# Patient Record
Sex: FEMALE | Race: WHITE | Employment: STUDENT | URBAN - METROPOLITAN AREA
[De-identification: names, ages, dates, MRNs, and addresses within clinical notes are randomized per-mention and may not be internally consistent; named-entity substitution may affect disease eponyms.]

---
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
## 2025-06-09 ENCOUNTER — HOSPITAL ENCOUNTER (EMERGENCY)
Facility: CLINIC | Age: 20
Discharge: LEFT BEFORE DISCHARGE | End: 2025-06-10
Admitting: EMERGENCY MEDICINE
Payer: COMMERCIAL

## 2025-06-09 VITALS
SYSTOLIC BLOOD PRESSURE: 118 MMHG | TEMPERATURE: 97.7 F | OXYGEN SATURATION: 99 % | HEART RATE: 67 BPM | RESPIRATION RATE: 17 BRPM | DIASTOLIC BLOOD PRESSURE: 70 MMHG

## 2025-06-09 PROCEDURE — 99281 EMR DPT VST MAYX REQ PHY/QHP: CPT

## 2025-06-09 ASSESSMENT — COLUMBIA-SUICIDE SEVERITY RATING SCALE - C-SSRS
1. IN THE PAST MONTH, HAVE YOU WISHED YOU WERE DEAD OR WISHED YOU COULD GO TO SLEEP AND NOT WAKE UP?: NO
2. HAVE YOU ACTUALLY HAD ANY THOUGHTS OF KILLING YOURSELF IN THE PAST MONTH?: NO
6. HAVE YOU EVER DONE ANYTHING, STARTED TO DO ANYTHING, OR PREPARED TO DO ANYTHING TO END YOUR LIFE?: NO

## 2025-06-09 ASSESSMENT — ACTIVITIES OF DAILY LIVING (ADL): ADLS_ACUITY_SCORE: 41

## 2025-06-10 ASSESSMENT — ACTIVITIES OF DAILY LIVING (ADL): ADLS_ACUITY_SCORE: 41

## 2025-06-10 NOTE — ED TRIAGE NOTES
Pt reports lower back pain that wraps around to abd for past 4 days, worse tonight. Pt reports no BM for 5 days and took miralax without relief.      Triage Assessment (Adult)       Row Name 06/09/25 2526          Triage Assessment    Airway WDL WDL        Respiratory WDL    Respiratory WDL WDL        Cardiac WDL    Cardiac WDL WDL        Peripheral/Neurovascular WDL    Peripheral Neurovascular WDL WDL        Cognitive/Neuro/Behavioral WDL    Cognitive/Neuro/Behavioral WDL WDL

## 2025-06-11 ENCOUNTER — APPOINTMENT (OUTPATIENT)
Dept: CT IMAGING | Facility: CLINIC | Age: 20
End: 2025-06-11
Attending: PHYSICIAN ASSISTANT
Payer: COMMERCIAL

## 2025-06-11 ENCOUNTER — OFFICE VISIT (OUTPATIENT)
Dept: URGENT CARE | Facility: URGENT CARE | Age: 20
End: 2025-06-11
Payer: COMMERCIAL

## 2025-06-11 ENCOUNTER — HOSPITAL ENCOUNTER (EMERGENCY)
Facility: CLINIC | Age: 20
Discharge: HOME | End: 2025-06-11
Attending: EMERGENCY MEDICINE
Payer: COMMERCIAL

## 2025-06-11 VITALS
RESPIRATION RATE: 17 BRPM | OXYGEN SATURATION: 96 % | BODY MASS INDEX: 25.07 KG/M2 | SYSTOLIC BLOOD PRESSURE: 127 MMHG | TEMPERATURE: 100.8 F | HEIGHT: 66 IN | WEIGHT: 156 LBS | HEART RATE: 102 BPM | DIASTOLIC BLOOD PRESSURE: 77 MMHG

## 2025-06-11 VITALS
HEIGHT: 66 IN | DIASTOLIC BLOOD PRESSURE: 63 MMHG | BODY MASS INDEX: 24.11 KG/M2 | WEIGHT: 150 LBS | RESPIRATION RATE: 19 BRPM | HEART RATE: 115 BPM | OXYGEN SATURATION: 97 % | SYSTOLIC BLOOD PRESSURE: 124 MMHG | TEMPERATURE: 98.9 F

## 2025-06-11 DIAGNOSIS — K59.00 CONSTIPATION, UNSPECIFIED CONSTIPATION TYPE: ICD-10-CM

## 2025-06-11 DIAGNOSIS — M54.50 ACUTE LEFT-SIDED LOW BACK PAIN WITHOUT SCIATICA: ICD-10-CM

## 2025-06-11 DIAGNOSIS — M54.9 BACK PAIN: ICD-10-CM

## 2025-06-11 DIAGNOSIS — R50.9 FEVER IN ADULT: Primary | ICD-10-CM

## 2025-06-11 DIAGNOSIS — R50.9 FEVER: ICD-10-CM

## 2025-06-11 DIAGNOSIS — R10.814 ABDOMINAL TENDERNESS OF LEFT LOWER QUADRANT, REBOUND TENDERNESS PRESENCE NOT SPECIFIED: ICD-10-CM

## 2025-06-11 LAB
ALBUMIN SERPL BCG-MCNC: 4.9 G/DL (ref 3.5–5.2)
ALBUMIN UR-MCNC: NEGATIVE MG/DL
ALP SERPL-CCNC: 62 U/L (ref 40–150)
ALT SERPL W P-5'-P-CCNC: 13 U/L (ref 0–50)
ANION GAP SERPL CALCULATED.3IONS-SCNC: 16 MMOL/L (ref 7–15)
APPEARANCE UR: CLEAR
AST SERPL W P-5'-P-CCNC: 19 U/L (ref 0–45)
BASOPHILS # BLD AUTO: 0.1 10E3/UL (ref 0–0.2)
BASOPHILS NFR BLD AUTO: 1 %
BILIRUB DIRECT SERPL-MCNC: 0.21 MG/DL (ref 0–0.3)
BILIRUB SERPL-MCNC: 0.7 MG/DL
BILIRUB UR QL STRIP: NEGATIVE
BUN SERPL-MCNC: 10.7 MG/DL (ref 6–20)
CALCIUM SERPL-MCNC: 9.7 MG/DL (ref 8.8–10.4)
CHLORIDE SERPL-SCNC: 97 MMOL/L (ref 98–107)
COLOR UR AUTO: ABNORMAL
CREAT SERPL-MCNC: 0.89 MG/DL (ref 0.51–0.95)
EGFRCR SERPLBLD CKD-EPI 2021: >90 ML/MIN/1.73M2
EOSINOPHIL # BLD AUTO: 0 10E3/UL (ref 0–0.7)
EOSINOPHIL NFR BLD AUTO: 0 %
ERYTHROCYTE [DISTWIDTH] IN BLOOD BY AUTOMATED COUNT: 11.6 % (ref 10–15)
FLUAV RNA SPEC QL NAA+PROBE: NEGATIVE
FLUBV RNA RESP QL NAA+PROBE: NEGATIVE
GLUCOSE SERPL-MCNC: 115 MG/DL (ref 70–99)
GLUCOSE UR STRIP-MCNC: NEGATIVE MG/DL
HCG UR QL: NEGATIVE
HCO3 SERPL-SCNC: 23 MMOL/L (ref 22–29)
HCT VFR BLD AUTO: 39.6 % (ref 35–47)
HGB BLD-MCNC: 13.5 G/DL (ref 11.7–15.7)
HGB UR QL STRIP: NEGATIVE
IMM GRANULOCYTES # BLD: 0 10E3/UL
IMM GRANULOCYTES NFR BLD: 0 %
KETONES UR STRIP-MCNC: 10 MG/DL
LEUKOCYTE ESTERASE UR QL STRIP: NEGATIVE
LYMPHOCYTES # BLD AUTO: 1.1 10E3/UL (ref 0.8–5.3)
LYMPHOCYTES NFR BLD AUTO: 11 %
MCH RBC QN AUTO: 29.4 PG (ref 26.5–33)
MCHC RBC AUTO-ENTMCNC: 34.1 G/DL (ref 31.5–36.5)
MCV RBC AUTO: 86 FL (ref 78–100)
MONOCYTES # BLD AUTO: 0.8 10E3/UL (ref 0–1.3)
MONOCYTES NFR BLD AUTO: 8 %
NEUTROPHILS # BLD AUTO: 7.7 10E3/UL (ref 1.6–8.3)
NEUTROPHILS NFR BLD AUTO: 80 %
NITRATE UR QL: NEGATIVE
NRBC # BLD AUTO: 0 10E3/UL
NRBC BLD AUTO-RTO: 0 /100
PH UR STRIP: 6.5 [PH] (ref 5–7)
PLATELET # BLD AUTO: 226 10E3/UL (ref 150–450)
POTASSIUM SERPL-SCNC: 3.7 MMOL/L (ref 3.4–5.3)
PROT SERPL-MCNC: 8.3 G/DL (ref 6.4–8.3)
RBC # BLD AUTO: 4.59 10E6/UL (ref 3.8–5.2)
RSV RNA SPEC NAA+PROBE: NEGATIVE
SARS-COV-2 RNA RESP QL NAA+PROBE: NEGATIVE
SODIUM SERPL-SCNC: 136 MMOL/L (ref 135–145)
SP GR UR STRIP: 1 (ref 1–1.03)
TSH SERPL DL<=0.005 MIU/L-ACNC: 1.11 UIU/ML (ref 0.3–4.2)
UROBILINOGEN UR STRIP-MCNC: NORMAL MG/DL
WBC # BLD AUTO: 9.6 10E3/UL (ref 4–11)

## 2025-06-11 PROCEDURE — 258N000003 HC RX IP 258 OP 636: Performed by: EMERGENCY MEDICINE

## 2025-06-11 PROCEDURE — 85025 COMPLETE CBC W/AUTO DIFF WBC: CPT | Performed by: EMERGENCY MEDICINE

## 2025-06-11 PROCEDURE — 74177 CT ABD & PELVIS W/CONTRAST: CPT

## 2025-06-11 PROCEDURE — 81003 URINALYSIS AUTO W/O SCOPE: CPT | Performed by: EMERGENCY MEDICINE

## 2025-06-11 PROCEDURE — 3078F DIAST BP <80 MM HG: CPT | Performed by: NURSE PRACTITIONER

## 2025-06-11 PROCEDURE — 84443 ASSAY THYROID STIM HORMONE: CPT | Performed by: EMERGENCY MEDICINE

## 2025-06-11 PROCEDURE — 84155 ASSAY OF PROTEIN SERUM: CPT | Performed by: EMERGENCY MEDICINE

## 2025-06-11 PROCEDURE — 85004 AUTOMATED DIFF WBC COUNT: CPT | Performed by: EMERGENCY MEDICINE

## 2025-06-11 PROCEDURE — 87637 SARSCOV2&INF A&B&RSV AMP PRB: CPT | Performed by: PHYSICIAN ASSISTANT

## 2025-06-11 PROCEDURE — 250N000009 HC RX 250: Performed by: PHYSICIAN ASSISTANT

## 2025-06-11 PROCEDURE — 250N000011 HC RX IP 250 OP 636: Performed by: PHYSICIAN ASSISTANT

## 2025-06-11 PROCEDURE — 99205 OFFICE O/P NEW HI 60 MIN: CPT | Performed by: NURSE PRACTITIONER

## 2025-06-11 PROCEDURE — 81025 URINE PREGNANCY TEST: CPT | Performed by: EMERGENCY MEDICINE

## 2025-06-11 PROCEDURE — 80048 BASIC METABOLIC PNL TOTAL CA: CPT | Performed by: EMERGENCY MEDICINE

## 2025-06-11 PROCEDURE — 3074F SYST BP LT 130 MM HG: CPT | Performed by: NURSE PRACTITIONER

## 2025-06-11 PROCEDURE — 96361 HYDRATE IV INFUSION ADD-ON: CPT

## 2025-06-11 PROCEDURE — 36415 COLL VENOUS BLD VENIPUNCTURE: CPT | Performed by: EMERGENCY MEDICINE

## 2025-06-11 PROCEDURE — 99285 EMERGENCY DEPT VISIT HI MDM: CPT | Mod: 25

## 2025-06-11 PROCEDURE — 96360 HYDRATION IV INFUSION INIT: CPT | Mod: 59

## 2025-06-11 RX ORDER — IOPAMIDOL 755 MG/ML
75 INJECTION, SOLUTION INTRAVASCULAR ONCE
Status: COMPLETED | OUTPATIENT
Start: 2025-06-11 | End: 2025-06-11

## 2025-06-11 RX ADMIN — IOPAMIDOL 75 ML: 755 INJECTION, SOLUTION INTRAVENOUS at 19:40

## 2025-06-11 RX ADMIN — SODIUM CHLORIDE 62 ML: 9 INJECTION, SOLUTION INTRAVENOUS at 19:40

## 2025-06-11 RX ADMIN — SODIUM CHLORIDE 1000 ML: 0.9 INJECTION, SOLUTION INTRAVENOUS at 18:05

## 2025-06-11 ASSESSMENT — ACTIVITIES OF DAILY LIVING (ADL)
ADLS_ACUITY_SCORE: 41

## 2025-06-11 NOTE — PROGRESS NOTES
Urgent Care Clinic Visit    Chief Complaint   Patient presents with    Fever     Last night started to have a fever     Back Pain     X5 days of back pain   Started as lower back pain now moved up     Urgent Care     Last weekend was in [facility] [place]               6/11/2025     4:32 PM   Additional Questions   Roomed by trudy coleman

## 2025-06-11 NOTE — PATIENT INSTRUCTIONS
Triaged to ER for higher level of care given 7/10 left-sided low back pain with fever constipation left lower quadrant abdominal tenderness weakness malaise dizziness worsening over the past 5 days  Last bowel movement was yesterday, small  Urgent care is limited without stat labs advanced imaging IV access  Relevant PMH of appendectomy  No obvious UTI symptoms, no concern for PID, normal menstrual cycle  Differentials include colitis, pyelonephritis, infected kidney stone, intra-abdominal infection abscess, gastroenteritis, ileus obstruction perforation, pelvic infection, less likely ectopic  Patient will go to local ER now by car with mom, call 911 if worsening in route

## 2025-06-11 NOTE — PROGRESS NOTES
"Chief Complaint   Patient presents with    Fever     Last night started to have a fever     Back Pain     X5 days of back pain   Started as lower back pain now moved up     Urgent Care     Last weekend was in [facility]     SUBJECTIVE:  Kisha Brunner is a 20 year old female relevant PMH of appendectomy paraovarian cyst who presents with 7/10 left-sided low back pain constipation left lower quadrant abdominal tenderness weakness malaise dizziness worsening over the past 5 days, fever started yesterday. Last bowel movement was yesterday, small. Took miralax and enemas.  No dysuria hematuria nausea vomiting vaginal discharge pelvic pain.  Endorses normal menstrual cycle. She does have a history of UTIs and this does not feel like one.    No past medical history on file.  Current Outpatient Medications   Medication Sig Dispense Refill    ISOtretinoin (ACCUTANE PO)  (Patient not taking: Reported on 6/11/2025)      Pediatric Multiple Vit-C-FA (FRUITY CHEWABLES MULTIVITAMIN PO)  (Patient not taking: Reported on 6/11/2025)       Social History     Tobacco Use    Smoking status: Never    Smokeless tobacco: Never   Substance Use Topics    Alcohol use: Not on file     No Known Allergies    Review of Systems  ROS: 10 point ROS neg other than the symptoms noted above in the HPI.    OBJECTIVE:  /77   Pulse 102   Temp 100.8  F (38.2  C) (Oral)   Resp 17   Ht 1.676 m (5' 6\")   Wt 70.8 kg (156 lb)   SpO2 96%   BMI 25.18 kg/m      Physical Exam  Vitals reviewed.   Constitutional:       General: She is not in acute distress.     Appearance: Normal appearance. She is well-developed. She is ill-appearing. She is not toxic-appearing or diaphoretic.   Cardiovascular:      Pulses: Normal pulses.   Pulmonary:      Effort: Pulmonary effort is normal.   Abdominal:      General: Bowel sounds are normal. There is no distension.      Palpations: Abdomen is soft. There is no mass.      Tenderness: There is abdominal tenderness " (LLQ abdomen). There is no right CVA tenderness, left CVA tenderness (nontender to percussion), guarding or rebound.      Hernia: No hernia is present.   Musculoskeletal:         General: Normal range of motion.   Skin:     General: Skin is warm and dry.      Capillary Refill: Capillary refill takes less than 2 seconds.      Coloration: Skin is not jaundiced or pale.   Neurological:      General: No focal deficit present.      Mental Status: She is alert and oriented to person, place, and time.      Motor: No weakness.      Gait: Gait normal.   Psychiatric:         Behavior: Behavior normal.       ASSESSMENT:    ICD-10-CM    1. Fever in adult  R50.9 CANCELED: UA Macroscopic with reflex to Microscopic and Culture - Clinic Collect      2. Abdominal tenderness of left lower quadrant, rebound tenderness presence not specified  R10.814       3. Acute left-sided low back pain without sciatica  M54.50       4. Constipation, unspecified constipation type  K59.00         PLAN:   Triaged to ER for higher level of care given 7/10 left-sided low back pain with fever constipation left lower quadrant abdominal tenderness weakness malaise dizziness worsening over the past 5 days  Last bowel movement was yesterday, small  Relevant PMH of appendectomy  No obvious UTI or vaginal symptoms  Differentials include colitis, ileus obstruction perforation, IBD, pyelonephritis, infected kidney stone, intra-abdominal infection abscess, gastroenteritis, pelvic infection, less likely ectopic, PID  Urgent care is limited without stat labs advanced imaging IV access  Patient will go to local ER now by car with mom, call 911 if worsening in route    Follow up with primary care provider with any problems, questions or concerns or if symptoms worsen or fail to improve. Patient agreed to plan and verbalized understanding.    Emilie Huizar, [facility]

## 2025-06-11 NOTE — ED TRIAGE NOTES
5 days ago started getting constipated with LBP.  Reports last night woke up with a temp of 101 with severe back pain on L side.  Sent from clinic for fever/tachycardia.   Took ibuprofen a couple hours ago.  Denies trauma.  Denies incontinence.  Ambulatory.      Triage Assessment (Adult)       Row Name 06/11/25 1800          Triage Assessment    Airway WDL WDL        Respiratory WDL    Respiratory WDL WDL        Skin Circulation/Temperature WDL    Skin Circulation/Temperature WDL WDL        Cardiac WDL    Cardiac WDL WDL        Peripheral/Neurovascular WDL    Peripheral Neurovascular WDL WDL        Cognitive/Neuro/Behavioral WDL    Cognitive/Neuro/Behavioral WDL WDL

## 2025-06-12 ENCOUNTER — HOSPITAL ENCOUNTER (EMERGENCY)
Facility: CLINIC | Age: 20
Discharge: HOME | End: 2025-06-12
Admitting: STUDENT IN AN ORGANIZED HEALTH CARE EDUCATION/TRAINING PROGRAM
Payer: COMMERCIAL

## 2025-06-12 VITALS
OXYGEN SATURATION: 99 % | TEMPERATURE: 98.7 F | DIASTOLIC BLOOD PRESSURE: 78 MMHG | HEIGHT: 66 IN | HEART RATE: 98 BPM | SYSTOLIC BLOOD PRESSURE: 135 MMHG | BODY MASS INDEX: 24.11 KG/M2 | RESPIRATION RATE: 18 BRPM | WEIGHT: 150 LBS

## 2025-06-12 DIAGNOSIS — R10.32 ABDOMINAL PAIN, LEFT LOWER QUADRANT: ICD-10-CM

## 2025-06-12 DIAGNOSIS — K59.00 CONSTIPATION, UNSPECIFIED CONSTIPATION TYPE: ICD-10-CM

## 2025-06-12 PROCEDURE — 99282 EMERGENCY DEPT VISIT SF MDM: CPT

## 2025-06-12 RX ORDER — MAGNESIUM CARB/ALUMINUM HYDROX 105-160MG
296 TABLET,CHEWABLE ORAL ONCE
Qty: 296 ML | Refills: 0 | Status: SHIPPED | OUTPATIENT
Start: 2025-06-12 | End: 2025-06-12

## 2025-06-12 ASSESSMENT — COLUMBIA-SUICIDE SEVERITY RATING SCALE - C-SSRS
2. HAVE YOU ACTUALLY HAD ANY THOUGHTS OF KILLING YOURSELF IN THE PAST MONTH?: NO
1. IN THE PAST MONTH, HAVE YOU WISHED YOU WERE DEAD OR WISHED YOU COULD GO TO SLEEP AND NOT WAKE UP?: NO
6. HAVE YOU EVER DONE ANYTHING, STARTED TO DO ANYTHING, OR PREPARED TO DO ANYTHING TO END YOUR LIFE?: NO

## 2025-06-12 NOTE — DISCHARGE INSTRUCTIONS
You were evaluated in the emergency department today for concerns of continued abdominal and back pain  Your belly is soft and exam is reassuring. Your CT scan yesterday did not show any signs of significant stool burden indicating severe constipation, bowel obstruction, kidney stone, ovarian cyst, or any other acute findings.    Ultimately, if you have not had a bowel movement in a week I recommend continuing daily miralax and increasing your food intake. I have given you a prescription for a medication called magnesium citrate that you can take to help try to produce a bowel movement.     You have full range of motion of your neck in all directions and your physical exam tests for meningitis are negative. You did not show signs of meningitis. I recommend a heating pad on your neck and upper back for 10 to 15 minutes a few times per day to help relax musculature.  You may take Ibuprofen up to 400 mg by mouth every 4-6 hours as needed for pain. Do not exceed 2400 mg/day.  You may take Tylenol 325-1000 mg by mouth every 4-6 hours as needed for pain. Do not exceed 1000mg (1g) in 4 hours or 4 g/day from all sources.  You may combine Tylenol and Ibuprofen- up to 400 mg of ibuprofen and 1000 mg of Tylenol at the same time, up to 3 times a day for the pain    Follow-up with your primary care provider tomorrow as scheduled.    Return to the ER if you develop any severe worsened abdominal pain with bloating and distention, intractable nausea or vomiting, or fevers that are not coming down with Tylenol and ibuprofen

## 2025-06-12 NOTE — ED PROVIDER NOTES
"  Emergency Department Note      History of Present Illness     Chief Complaint   Back Pain      HPI   Kisha Brunner is a 20 year old female with no past medical history presents today with left low back pain, constipation and fever.  Patient reports that for the past 5 to 6 days she has not had a significant bowel movement.  She is normally very regular, but had this change.  This was initially after a trip she felt that initially it may have been due to change in her foods she normally eats.  She does state that she tried MiraLAX and an at-home enema, which neither allowed her to have a significant bowel movement.  She has noted some small, hard bowel movements since this date.  At that time she also developed some mild achy pain in her left lower back, feels like it is not muscular but more internal.  She then states that for the past couple of days this pain has been worsening.  She also developed a fever for the last 2 days, recorded at home with a thermometer up to 101.  She has had previous abdominal surgery of her appendix.  She denies any urinary headaches, URI symptoms, chest pain, shortness of breath.  She denies any dysuria or hematuria.  She denies any melena or hematochezia.  Patient denies any chance of pregnancy or STIs.  Her last menstrual period was 2 weeks ago.    Independent Historian   None    Review of External Notes   None      Past Medical History     Medical History and Problem List   No past medical history on file.    Medications   ISOtretinoin (ACCUTANE PO)  Pediatric Multiple Vit-C-FA (FRUITY CHEWABLES MULTIVITAMIN PO)        Surgical History   No past surgical history on file.    Physical Exam     Patient Vitals for the past 24 hrs:   BP Temp Temp src Pulse Resp SpO2 Height Weight   06/11/25 2217 -- 98.9  F (37.2  C) Oral -- -- -- -- --   06/11/25 1801 124/63 -- -- -- -- -- -- --   06/11/25 1800 -- 100.2  F (37.9  C) Oral 115 19 97 % 1.676 m (5' 6\") 68 kg (150 lb)     Physical " Exam  General: No acute distress  Head: normocephalic, atraumatic  Eyes: Conjunctiva normal  Throat: moist mucus membranes  Neck: ROM normal  Cardiovascular: regular rate, regular rhythm, no murmurs  Pulmonology: Regular rate, normal effort, no wheezing, crackles, or rales  Abdomen: Soft, no masses or organomegaly, Bowel sounds present.  Mild tenderness to left lower quadrant.  No CVA tenderness.  Musculoskeletal: moving upper and lower extremities symmetrically.  No pain to palpation of the lumbar spinous processes.  No pain to palpation of spinal muscles.  Skin: Warm, dry.  No rashes present  Neuro: Alert and oriented  Psych: normal mood and affect        Diagnostics     Lab Results   Labs Ordered and Resulted from Time of ED Arrival to Time of ED Departure   UA MACROSCOPIC WITH REFLEX TO MICRO AND CULTURE - Abnormal       Result Value    Color Urine Straw      Appearance Urine Clear      Glucose Urine Negative      Bilirubin Urine Negative      Ketones Urine 10 (*)     Specific Gravity Urine 1.004      Blood Urine Negative      pH Urine 6.5      Protein Albumin Urine Negative      Urobilinogen Urine Normal      Nitrite Urine Negative      Leukocyte Esterase Urine Negative     BASIC METABOLIC PANEL - Abnormal    Sodium 136      Potassium 3.7      Chloride 97 (*)     Carbon Dioxide (CO2) 23      Anion Gap 16 (*)     Urea Nitrogen 10.7      Creatinine 0.89      GFR Estimate >90      Calcium 9.7      Glucose 115 (*)    HCG QUALITATIVE URINE - Normal    hCG Urine Qualitative Negative     HEPATIC FUNCTION PANEL - Normal    Protein Total 8.3      Albumin 4.9      Bilirubin Total 0.7      Alkaline Phosphatase 62      AST 19      ALT 13      Bilirubin Direct 0.21     TSH WITH FREE T4 REFLEX - Normal    TSH 1.11     INFLUENZA A/B, RSV AND SARS-COV2 PCR - Normal    Influenza A PCR Negative      Influenza B PCR Negative      RSV PCR Negative      SARS CoV2 PCR Negative     CBC WITH PLATELETS AND DIFFERENTIAL    WBC Count 9.6       RBC Count 4.59      Hemoglobin 13.5      Hematocrit 39.6      MCV 86      MCH 29.4      MCHC 34.1      RDW 11.6      Platelet Count 226      % Neutrophils 80      % Lymphocytes 11      % Monocytes 8      % Eosinophils 0      % Basophils 1      % Immature Granulocytes 0      NRBCs per 100 WBC 0      Absolute Neutrophils 7.7      Absolute Lymphocytes 1.1      Absolute Monocytes 0.8      Absolute Eosinophils 0.0      Absolute Basophils 0.1      Absolute Immature Granulocytes 0.0      Absolute NRBCs 0.0         Imaging   CT Abdomen Pelvis w Contrast   Final Result   IMPRESSION:    Nonspecific changes in the upper pole of the left kidney and left ureter. This appearance typically indicates urinary tract infection, but in the absence of supportive findings on urinalysis, renal infarction or sequelae of a recently passed left    ureteral stone could be considered.               Independent Interpretation   None    ED Course      Medications Administered   Medications   sodium chloride 0.9% BOLUS 1,000 mL (0 mLs Intravenous Stopped 6/11/25 2057)   iopamidol (ISOVUE-370) solution 75 mL (75 mLs Intravenous $Given 6/11/25 1940)   sodium chloride 0.9 % bag for CT scan flush (62 mLs Intravenous $Given 6/11/25 1940)       Procedures   Procedures     Discussion of Management   None    ED Course        Additional Documentation  None    Medical Decision Making / Diagnosis     CMS Diagnoses: None    MIPS   None               MDM   Kisha Brunner is a 20 year old female with history of appendectomy presents today with left low back pain and fever.  Patient does report constipation for the past 5 days and development of a fever within the last 2 days.  She has also had this left low back pain that she states is a dull ache.  Possible differentials include colitis, ileus, IBD, diverticulitis, pyelonephritis, kidney stone, gastroenteritis, pelvic infection.  Upon arrival to the emergency department the patient was febrile and  tachycardic.  Proceeded with fluids and basic labs.  CBC with no signs of any white count.  Her BMP did come back with a mild anion gap, consistent with possible dehydration from acute infection.  Given possibility for pyelonephritis or kidney stone as a differential, proceeded with a UA.  This came back negative for any RBC or WBC.  I did proceed with a CT abdomen pelvis given fever and history of constipation.  This came back with a nonspecific change to the left upper pole of the kidney, most consistent with pyelonephritis.  Given completely negative urinalysis, I do not think that this is the most likely cause of this irregularity on imaging.  Other possible causes of this is recent kidney stone or renal infarct.  Patient's history inconsistent with kidney stone, also negative RBC on urinalysis.  Patient with no risk factors for renal infarct, does not seem consistent with her story for this as well.  Will have her follow-up with her primary care doctor for repeat evaluation of this, likely with renal ultrasound.  Patient with no other acute abnormalities on the CT scan.  No significant stool burden, or signs of colitis.  Ovaries were normal in appearance, no signs of any significant abscess or cyst.  Given ovaries were of normal size on CT scan, did not feel like it was likely pathology for fever and pain.  We did proceed with a COVID and flu swab which was negative.  Discussed with patient that she is well-appearing, her fever did improve significantly with the Tylenol.  Discussed that her fever and pain may be from a viral illness that is undetectable on labs today.  She should continue to take Tylenol and ibuprofen for the fever.  She is pleasant rest and drink fluids.  She should follow-up with her primary care later on this week and next week for further evaluation and treatment.  She should return to the emergency department for any worsening pain or significant vomiting.  The patient was in agreement  with the plan all questions were answered    Disposition   The patient was discharged.     Diagnosis     ICD-10-CM    1. Fever  R50.9       2. Back pain  M54.9            Discharge Medications   Discharge Medication List as of 6/11/2025 10:57 PM            Mikayla Cope PA-C    This patient was staffed with Dr. Linwood Augustin, who saw the patient and agreed with the plan.       Mikayla Cope PA-C  06/12/25 0049

## 2025-06-12 NOTE — ED TRIAGE NOTES
PT reports back pain, a headache, and neck stiffness. PT was seen at the ED yesterday got labs and a CT for same sx and told it is a viral infection. PT returning today concerned it is viral meningitis.      Triage Assessment (Adult)       Row Name 06/12/25 2136          Triage Assessment    Airway WDL WDL        Respiratory WDL    Respiratory WDL WDL        Skin Circulation/Temperature WDL    Skin Circulation/Temperature WDL WDL        Peripheral/Neurovascular WDL    Peripheral Neurovascular WDL WDL        Cognitive/Neuro/Behavioral WDL    Cognitive/Neuro/Behavioral WDL WDL

## 2025-06-12 NOTE — DISCHARGE INSTRUCTIONS
We think that your fever and back pain may be from a viral illness today.  Please continue to take Tylenol and ibuprofen for the fever.  Continue to hydrate with lots of fluids.  Please follow-up with your primary care doctor in the next couple of days for further evaluation and treatment.  Your primary care doctor should also follow-up on the abnormality seen on imaging in the kidney, this would likely be with a kidney ultrasound in a few months.

## 2025-06-12 NOTE — ED NOTES
ED APC SUPERVISION NOTE:   I evaluated this patient in conjunction with Mikayla Cope PA-C  I have participated in the care of the patient and personally performed key elements of the history, exam, and medical decision making.      HPI:   Kisha Brunner is a 20 year old female presenting to the ED for evaluation of constipation. The patient reports that she has been experiencing constipation with progressively worsening low back pain for about 5 days. She has had no significant bowel movements with Miralax and at home enemas. The patient has measured a fever of 101 F at home today as well. No black/bloody stools, urinary symptoms, recent trauma to the back. No concern for STD or chance of pregnancy.  He notes mild left lower abdominal pain.  No pelvic pain, vaginal bleeding or vaginal discharge.  She denies any urinary symptoms.  No hematuria.  No URI symptoms, chest pain, cough or shortness of breath.    Independent Historian:   None    Review of External Notes: None      EXAM:   General: Well appearing, nontoxic.  Resting comfortably  Head:  Scalp, face, and head appear normal  Eyes:  Pupils are equal, round    Conjunctivae non-injected and sclerae white  ENT:    The external nose is normal    Pinnae are normal  Neck:  Normal range of motion    There is no rigidity noted    Trachea is in the midline  CV:  Regular rate and rhythm     Normal S1/S2, no S3/S4    No murmur or rub. Radial pulses 2+ bilaterally.  Resp:  Lungs are clear and equal bilaterally  There is no tachypnea    No increased work of breathing    No rales, wheezing, or rhonchi  GI:  Abdomen is soft, no rigidity or guarding    No distension, or mass    Mild LLQ tenderness. No rebound tenderness   MS:  Normal muscular tone  Skin:  No rash or acute skin lesions noted  Neuro:  Awake and alert  Speech is normal and fluent  Moves all extremities spontaneously  Psych: Normal affect. Appropriate interactions.      Independent Interpretation (X-rays, CTs,  rhythm strip):  None    Consultations/Discussion of Management or Tests:  None     MIPS:      None    MEDICAL DECISION MAKING/ASSESSMENT AND PLAN:   Kisha Brunner is a 20 year old female presents for evaluation of reported fever, feelings of constipation and mild left lower abdominal pain.  On my evaluation the patient is well-appearing, hemodynamically stable and afebrile.  Abdominal exam is benign without evidence of peritonitis.  ED evaluation is overall reassuring.  CBC without leukocytosis.  No anemia.  CMP is unremarkable.  Urinalysis negative for pyuria/UTI and negative for hematuria.  hCG is negative. COVID/Influenza/RSV testing negative.  She has no respiratory symptoms and lungs are clear to auscultation.  No evidence for pneumonia, URI.  TSH is normal.  Given her abdominal symptoms CT of the abdomen pelvis was performed and thankfully negative for any acute findings.  No evidence of any intra-abdominal infection, bowel obstruction, volvulus, colitis, diverticulitis.  No adnexal or pelvic pathology seen on CT.  Clinical evaluation is not consistent with ovarian torsion and she has no other pelvic symptoms to suggest PID or other pelvic infections.  No evidence to suggest TOA on CT scan.  Nonspecific changes were seen in the left kidney and left ureter differential diagnosis includes recently passed stone, UTI/pyelonephritis, renal infarct.  Patient has no risk factors for renal infarct.  Urinalysis with no findings whatsoever to suggest UTI.  At this time there is no evidence of any life-threatening emergent underlying pathology.  No findings to suggest sepsis.  Symptoms may be due to a viral syndrome.  I recommended supportive care at home with Tylenol, ibuprofen, rest and good oral fluid intake.  Follow-up with PCP is recommended.  A follow-up renal ultrasound may be of benefit.  Findings and plan of care were discussed with the patient and her mother.  Return precautions were provided and she was  discharged in stable condition.     DIAGNOSIS:     ICD-10-CM    1. Fever  R50.9       2. Back pain  M54.9           Scribe Disclosure:  I, Kisha Mock, am serving as a scribe at 7:32 PM on 6/11/2025 to document services personally performed by Linwood Augustin MD based on my observations and the provider's statements to me.     6/11/2025  [facility] EMERGENCY DEPT     Linwood Augustin MD  06/12/25 [id number]

## 2025-06-13 ENCOUNTER — TRANSFERRED RECORDS (OUTPATIENT)
Dept: HEALTH INFORMATION MANAGEMENT | Facility: CLINIC | Age: 20
End: 2025-06-13
Payer: COMMERCIAL

## 2025-06-13 NOTE — ED PROVIDER NOTES
"Emergency Department Encounter   NAME: Kisha Brunner ; AGE: 20 year old female ; YOB: 2005 ; MRN: [id number] ; PCP: Tatiana Miller   ED PROVIDER: Gina Wood PA-C    Evaluation Date & Time:   6/12/2025  5:53 PM    CHIEF COMPLAINT:  Back Pain and Headache        Impression and Plan   FINAL IMPRESSION:    ICD-10-CM    1. Constipation, unspecified constipation type  K59.00       2. Abdominal pain, left lower quadrant  R10.32           ED Course and Medical Decision Making  Kisha Brunner is a 20 year old female with a pertinent history of dysmenorrhea who presents to the ED for evaluation of continued back pain, headache, neck stiffness, fatigue, and constipation.  Per chart review, patient was evaluated in urgent care on 6/11 and sent to the emergency department for further evaluation.  He was evaluated in the [facility] emergency department for low back pain, constipation, and fever.  Patient had recently returned from a trip to [facility].  She has been trying MiraLAX and did an at home enema without significant bowel movement.  She did have a few small, hard bowel movements since starting MiraLAX and at home enema.  Influenza swab was negative. Blood work all normal and reassuring.  hCG negative.  CT abdomen pelvis with no acute findings.  There was a nonspecific change in the upper pole of the left kidney and left ureter that typically indicates UTI, however, in the absence of supportive findings on urinalysis radiology noted this could be due to recently passed stone.  Patient was discharged home with plans for follow-up with PCP if not improving.  Patient states feeling the same as yesterday.  She woke up this morning with some right-sided eye swelling and irritation that subsided shortly following.  Patient states overall she \"does not feel as though she got the answers she needed yesterday\" and is dissatisfied, prompting her to return to the emergency department " today.    Vitals reviewed and unremarkable, she is afebrile temp 98.7F. On exam she is resting comfortably, no acute distress.  She ambulates back to exam room without any significant gait antalgia or discomfort.  Nontoxic appearing.  Differential diagnosis/emergent conditions considered and evaluated for includes but not limited to constipation, UTI, ectopic pregnancy, ovarian torsion, bowel obstruction.  Her abdomen is soft and nondistended, no areas of tenderness.  She does endorse left-sided CVA tenderness, no right-sided CVA tenderness. She does not have any meningeal signs, negative Kernig and Brudzinski sign.  She has full ROM of the neck in all directions.  No fever or vomiting.  I do not suspect bacterial meningitis and have very low suspicion for viral meningitis as well.  Her posterior pharynx is clear, no edema or erythema.  No tonsillar swelling or exudates.  No cervical lymphadenopathy.  Right eye is not appreciably swollen or red when compared to the left side.  No conjunctival injection.  Her physical exam today is overall fairly benign.    I discussed with patient that she had a fairly extensive workup yesterday with no acute findings.  Her CT did not find any significant stool burden.  I suspect, since patient has not been feeling well and she has had decreased appetite that she is also having less frequent and smaller bowel movements.  I recommended patient try to increase her food intake and I have also given her a prescription for magnesium citrate.  We discussed repeating labs, urine, and CT here today but given her symptoms have not changed I do not know if there is any utility in this.  Patient points more so to the left lower quadrant than right.  States the pain is intermittent.  She is not doubled over in pain and I have very low suspicion for ovarian torsion.  We did discuss performing pelvic ultrasound to look for ovarian cyst vs other potential cause for her left-sided abdominal pain.   Ultimately, utilizing shared decision making patient feels comfortable not repeating any labs or imaging here today. She is feeling reassured.  Given patient a prescription for magnesium citrate to use if she does not have a good bowel movement in the next day or 2.  Recommended she increase her fiber intake and fluid intake as well.  Patient will follow-up with her primary care provider for recheck tomorrow or early next week.  She was educated on concerning symptoms of more return to the ER and is understanding and agreeable to this plan.          Medical Decision Making  I reviewed the EMR: Outpatient Record: ED visit 6/11/2025  Discharge. I prescribed additional prescription strength medication(s) as charted. See documentation for any additional details.    MIPS (CTPE, Dental pain, Flaherty, Sinusitis, Asthma/COPD, Head Trauma): Not Applicable    SEPSIS: None        ED COURSE:  5:45 PM I met and introduced myself to the patient. I gathered initial history and performed my physical exam. We discussed discharge, follow up, and reasons to return to the ED.     At the conclusion of the encounter I discussed the results of all the tests and the disposition. The questions were answered. The patient or family acknowledged understanding and was agreeable with the care plan.          MEDICATIONS GIVEN IN THE EMERGENCY DEPARTMENT:  Medications - No data to display      NEW PRESCRIPTIONS STARTED AT TODAY'S ED VISIT:  Discharge Medication List as of 6/12/2025  5:53 PM        START taking these medications    Details   magnesium citrate 1.745 GM/30ML solution Take 296 mLs by mouth once for 1 dose., Disp-296 mL, R-0, Local Print               HPI     Kisha Brunner is a 20 year old female with a pertinent history of dysmenorrhea who presents to the ED for evaluation of continued back pain, headache, neck stiffness, fatigue, and constipation.  Per chart review, patient was evaluated in urgent care on 6/11 and sent to the  "emergency department for further evaluation.  He was evaluated in the [facility] emergency department for low back pain, constipation, and fever.  Patient had recently returned from a trip to [facility].  She has been trying MiraLAX and did an at home enema without significant bowel movement.  She did have a few small, hard bowel movements since starting MiraLAX and at home enema.  Influenza swab was negative. Blood work all normal and reassuring.  hCG negative.  CT abdomen pelvis with no acute findings.  There was a nonspecific change in the upper pole of the left kidney and left ureter that typically indicates UTI, however, in the absence of supportive findings on urinalysis radiology noted this could be due to recently passed stone.  Patient was discharged home with plans for follow-up with PCP if not improving.  Patient states feeling the same as yesterday.  She woke up this morning with some right-sided eye swelling and irritation that subsided shortly following.  Patient states overall she \"does not feel as though she got the answers she needed yesterday\" and is dissatisfied, prompting her to return to the emergency department today.      Physical Exam     First Vitals:  Patient Vitals for the past 24 hrs:   BP Temp Temp src Pulse Resp SpO2 Height Weight   06/12/25 1700 135/78 -- -- 98 18 99 % -- --   06/12/25 1615 (!) 153/85 98.7  F (37.1  C) Oral 98 16 96 % 1.676 m (5' 6\") 68 kg (150 lb)         PHYSICAL EXAM    General Appearance:  Alert, cooperative, no distress, appears stated age  HENT: Normocephalic without obvious deformity, atraumatic. Mucous membranes moist   Eyes: Conjunctiva clear, Lids normal. No discharge.   Respiratory: No distress. Lungs clear to ausculation bilaterally. No wheezes, rhonchi or stridor  Cardiovascular: Regular rate and rhythm, no murmur. Normal cap refill. No peripheral edema  GI: Mild left lower quadrant abdominal tenderness.  No distention  : Mild left-sided CVA " tenderness, no right-sided CVA tenderness  Musculoskeletal: Moving all extremities. No gross deformities  Integument: Warm, dry, no rashes or lesions  Neurologic: Alert and orientated x3. No focal deficits.  Psych: Normal mood and affect        Results     LAB:  All pertinent labs reviewed and interpreted  Labs Ordered and Resulted from Time of ED Arrival to Time of ED Departure - No data to display    RADIOLOGY:  No orders to display           Gina Wood PA-C   Emergency Medicine   [facility] EMERGENCY ROOM       Gina Wood PA-C  06/13/25 0116

## 2025-06-14 ASSESSMENT — COLUMBIA-SUICIDE SEVERITY RATING SCALE - C-SSRS
6. HAVE YOU EVER DONE ANYTHING, STARTED TO DO ANYTHING, OR PREPARED TO DO ANYTHING TO END YOUR LIFE?: NO
2. HAVE YOU ACTUALLY HAD ANY THOUGHTS OF KILLING YOURSELF IN THE PAST MONTH?: NO
1. IN THE PAST MONTH, HAVE YOU WISHED YOU WERE DEAD OR WISHED YOU COULD GO TO SLEEP AND NOT WAKE UP?: NO

## 2025-06-15 ENCOUNTER — HOSPITAL ENCOUNTER (OUTPATIENT)
Facility: CLINIC | Age: 20
Setting detail: OBSERVATION
Discharge: HOME | End: 2025-06-15
Attending: EMERGENCY MEDICINE | Admitting: INTERNAL MEDICINE
Payer: COMMERCIAL

## 2025-06-15 ENCOUNTER — APPOINTMENT (OUTPATIENT)
Dept: CT IMAGING | Facility: CLINIC | Age: 20
End: 2025-06-15
Attending: EMERGENCY MEDICINE
Payer: COMMERCIAL

## 2025-06-15 ENCOUNTER — APPOINTMENT (OUTPATIENT)
Dept: ULTRASOUND IMAGING | Facility: CLINIC | Age: 20
End: 2025-06-15
Attending: EMERGENCY MEDICINE
Payer: COMMERCIAL

## 2025-06-15 VITALS
WEIGHT: 157.2 LBS | HEIGHT: 66 IN | SYSTOLIC BLOOD PRESSURE: 128 MMHG | OXYGEN SATURATION: 100 % | TEMPERATURE: 99.5 F | HEART RATE: 78 BPM | DIASTOLIC BLOOD PRESSURE: 77 MMHG | RESPIRATION RATE: 16 BRPM | BODY MASS INDEX: 25.26 KG/M2

## 2025-06-15 DIAGNOSIS — N10 PYELONEPHRITIS, ACUTE: ICD-10-CM

## 2025-06-15 LAB
ALBUMIN SERPL BCG-MCNC: 4.1 G/DL (ref 3.5–5.2)
ALBUMIN SERPL BCG-MCNC: 4.5 G/DL (ref 3.5–5.2)
ALBUMIN UR-MCNC: NEGATIVE MG/DL
ALP SERPL-CCNC: 53 U/L (ref 40–150)
ALP SERPL-CCNC: 64 U/L (ref 40–150)
ALT SERPL W P-5'-P-CCNC: 13 U/L (ref 0–50)
ALT SERPL W P-5'-P-CCNC: 14 U/L (ref 0–50)
ANION GAP SERPL CALCULATED.3IONS-SCNC: 11 MMOL/L (ref 7–15)
ANION GAP SERPL CALCULATED.3IONS-SCNC: 13 MMOL/L (ref 7–15)
APPEARANCE UR: CLEAR
AST SERPL W P-5'-P-CCNC: 18 U/L (ref 0–45)
AST SERPL W P-5'-P-CCNC: 25 U/L (ref 0–45)
BACTERIA #/AREA URNS HPF: ABNORMAL /HPF
BASOPHILS # BLD AUTO: 0 10E3/UL (ref 0–0.2)
BASOPHILS # BLD AUTO: 0 10E3/UL (ref 0–0.2)
BASOPHILS NFR BLD AUTO: 1 %
BASOPHILS NFR BLD AUTO: 1 %
BILIRUB SERPL-MCNC: 0.3 MG/DL
BILIRUB SERPL-MCNC: 0.3 MG/DL
BILIRUB UR QL STRIP: NEGATIVE
BUN SERPL-MCNC: 10.8 MG/DL (ref 6–20)
BUN SERPL-MCNC: 14.7 MG/DL (ref 6–20)
CALCIUM SERPL-MCNC: 8.9 MG/DL (ref 8.8–10.4)
CALCIUM SERPL-MCNC: 9.8 MG/DL (ref 8.8–10.4)
CHLORIDE SERPL-SCNC: 102 MMOL/L (ref 98–107)
CHLORIDE SERPL-SCNC: 106 MMOL/L (ref 98–107)
COLOR UR AUTO: ABNORMAL
CREAT SERPL-MCNC: 0.93 MG/DL (ref 0.51–0.95)
CREAT SERPL-MCNC: 0.95 MG/DL (ref 0.51–0.95)
CRP SERPL-MCNC: 98.5 MG/L
EGFRCR SERPLBLD CKD-EPI 2021: 87 ML/MIN/1.73M2
EGFRCR SERPLBLD CKD-EPI 2021: 89 ML/MIN/1.73M2
EOSINOPHIL # BLD AUTO: 0.1 10E3/UL (ref 0–0.7)
EOSINOPHIL # BLD AUTO: 0.1 10E3/UL (ref 0–0.7)
EOSINOPHIL NFR BLD AUTO: 2 %
EOSINOPHIL NFR BLD AUTO: 2 %
ERYTHROCYTE [DISTWIDTH] IN BLOOD BY AUTOMATED COUNT: 11.4 % (ref 10–15)
ERYTHROCYTE [DISTWIDTH] IN BLOOD BY AUTOMATED COUNT: 11.8 % (ref 10–15)
ERYTHROCYTE [SEDIMENTATION RATE] IN BLOOD BY WESTERGREN METHOD: 26 MM/HR (ref 0–20)
GLUCOSE SERPL-MCNC: 106 MG/DL (ref 70–99)
GLUCOSE SERPL-MCNC: 109 MG/DL (ref 70–99)
GLUCOSE UR STRIP-MCNC: NEGATIVE MG/DL
HCO3 SERPL-SCNC: 23 MMOL/L (ref 22–29)
HCO3 SERPL-SCNC: 23 MMOL/L (ref 22–29)
HCT VFR BLD AUTO: 33.5 % (ref 35–47)
HCT VFR BLD AUTO: 37.7 % (ref 35–47)
HGB BLD-MCNC: 11.6 G/DL (ref 11.7–15.7)
HGB BLD-MCNC: 13.1 G/DL (ref 11.7–15.7)
HGB UR QL STRIP: NEGATIVE
IMM GRANULOCYTES # BLD: 0 10E3/UL
IMM GRANULOCYTES # BLD: 0 10E3/UL
IMM GRANULOCYTES NFR BLD: 0 %
IMM GRANULOCYTES NFR BLD: 0 %
KETONES UR STRIP-MCNC: NEGATIVE MG/DL
LACTATE SERPL-SCNC: 0.6 MMOL/L (ref 0.7–2)
LEUKOCYTE ESTERASE UR QL STRIP: NEGATIVE
LIPASE SERPL-CCNC: 32 U/L (ref 13–60)
LYMPHOCYTES # BLD AUTO: 1.6 10E3/UL (ref 0.8–5.3)
LYMPHOCYTES # BLD AUTO: 1.6 10E3/UL (ref 0.8–5.3)
LYMPHOCYTES NFR BLD AUTO: 22 %
LYMPHOCYTES NFR BLD AUTO: 29 %
MCH RBC QN AUTO: 29.5 PG (ref 26.5–33)
MCH RBC QN AUTO: 29.8 PG (ref 26.5–33)
MCHC RBC AUTO-ENTMCNC: 34.6 G/DL (ref 31.5–36.5)
MCHC RBC AUTO-ENTMCNC: 34.7 G/DL (ref 31.5–36.5)
MCV RBC AUTO: 85 FL (ref 78–100)
MCV RBC AUTO: 86 FL (ref 78–100)
MONOCYTES # BLD AUTO: 0.8 10E3/UL (ref 0–1.3)
MONOCYTES # BLD AUTO: 0.8 10E3/UL (ref 0–1.3)
MONOCYTES NFR BLD AUTO: 10 %
MONOCYTES NFR BLD AUTO: 14 %
NEUTROPHILS # BLD AUTO: 3.1 10E3/UL (ref 1.6–8.3)
NEUTROPHILS # BLD AUTO: 4.8 10E3/UL (ref 1.6–8.3)
NEUTROPHILS NFR BLD AUTO: 55 %
NEUTROPHILS NFR BLD AUTO: 66 %
NITRATE UR QL: NEGATIVE
NRBC # BLD AUTO: 0 10E3/UL
NRBC # BLD AUTO: 0 10E3/UL
NRBC BLD AUTO-RTO: 0 /100
NRBC BLD AUTO-RTO: 0 /100
PH UR STRIP: 6 [PH] (ref 5–7)
PLATELET # BLD AUTO: 194 10E3/UL (ref 150–450)
PLATELET # BLD AUTO: 263 10E3/UL (ref 150–450)
POTASSIUM SERPL-SCNC: 3.8 MMOL/L (ref 3.4–5.3)
POTASSIUM SERPL-SCNC: 4.2 MMOL/L (ref 3.4–5.3)
PROCALCITONIN SERPL IA-MCNC: 0.13 NG/ML
PROT SERPL-MCNC: 6.9 G/DL (ref 6.4–8.3)
PROT SERPL-MCNC: 7.9 G/DL (ref 6.4–8.3)
RBC # BLD AUTO: 3.93 10E6/UL (ref 3.8–5.2)
RBC # BLD AUTO: 4.39 10E6/UL (ref 3.8–5.2)
RBC URINE: <1 /HPF
SODIUM SERPL-SCNC: 138 MMOL/L (ref 135–145)
SODIUM SERPL-SCNC: 140 MMOL/L (ref 135–145)
SP GR UR STRIP: 1.01 (ref 1–1.03)
SQUAMOUS EPITHELIAL: 2 /HPF
UROBILINOGEN UR STRIP-MCNC: NORMAL MG/DL
WBC # BLD AUTO: 5.7 10E3/UL (ref 4–11)
WBC # BLD AUTO: 7.3 10E3/UL (ref 4–11)
WBC URINE: 5 /HPF

## 2025-06-15 PROCEDURE — 83690 ASSAY OF LIPASE: CPT | Performed by: EMERGENCY MEDICINE

## 2025-06-15 PROCEDURE — 85025 COMPLETE CBC W/AUTO DIFF WBC: CPT | Performed by: INTERNAL MEDICINE

## 2025-06-15 PROCEDURE — 96375 TX/PRO/DX INJ NEW DRUG ADDON: CPT

## 2025-06-15 PROCEDURE — 99285 EMERGENCY DEPT VISIT HI MDM: CPT | Mod: 25

## 2025-06-15 PROCEDURE — 85652 RBC SED RATE AUTOMATED: CPT | Performed by: INTERNAL MEDICINE

## 2025-06-15 PROCEDURE — 74177 CT ABD & PELVIS W/CONTRAST: CPT

## 2025-06-15 PROCEDURE — 250N000013 HC RX MED GY IP 250 OP 250 PS 637: Performed by: INTERNAL MEDICINE

## 2025-06-15 PROCEDURE — 81001 URINALYSIS AUTO W/SCOPE: CPT | Performed by: EMERGENCY MEDICINE

## 2025-06-15 PROCEDURE — 80053 COMPREHEN METABOLIC PANEL: CPT | Performed by: EMERGENCY MEDICINE

## 2025-06-15 PROCEDURE — 96361 HYDRATE IV INFUSION ADD-ON: CPT

## 2025-06-15 PROCEDURE — 250N000011 HC RX IP 250 OP 636: Mod: JZ | Performed by: EMERGENCY MEDICINE

## 2025-06-15 PROCEDURE — 36415 COLL VENOUS BLD VENIPUNCTURE: CPT | Performed by: INTERNAL MEDICINE

## 2025-06-15 PROCEDURE — 83605 ASSAY OF LACTIC ACID: CPT | Performed by: EMERGENCY MEDICINE

## 2025-06-15 PROCEDURE — 84155 ASSAY OF PROTEIN SERUM: CPT | Performed by: INTERNAL MEDICINE

## 2025-06-15 PROCEDURE — 258N000003 HC RX IP 258 OP 636: Performed by: EMERGENCY MEDICINE

## 2025-06-15 PROCEDURE — 99418 PROLNG IP/OBS E/M EA 15 MIN: CPT | Performed by: INTERNAL MEDICINE

## 2025-06-15 PROCEDURE — 96365 THER/PROPH/DIAG IV INF INIT: CPT

## 2025-06-15 PROCEDURE — 85025 COMPLETE CBC W/AUTO DIFF WBC: CPT | Performed by: EMERGENCY MEDICINE

## 2025-06-15 PROCEDURE — G0378 HOSPITAL OBSERVATION PER HR: HCPCS

## 2025-06-15 PROCEDURE — 99207 PR APP CREDIT; MD BILLING SHARED VISIT: CPT | Performed by: STUDENT IN AN ORGANIZED HEALTH CARE EDUCATION/TRAINING PROGRAM

## 2025-06-15 PROCEDURE — 86140 C-REACTIVE PROTEIN: CPT | Performed by: INTERNAL MEDICINE

## 2025-06-15 PROCEDURE — 36415 COLL VENOUS BLD VENIPUNCTURE: CPT | Performed by: EMERGENCY MEDICINE

## 2025-06-15 PROCEDURE — 96376 TX/PRO/DX INJ SAME DRUG ADON: CPT

## 2025-06-15 PROCEDURE — 76856 US EXAM PELVIC COMPLETE: CPT

## 2025-06-15 PROCEDURE — 258N000003 HC RX IP 258 OP 636: Performed by: INTERNAL MEDICINE

## 2025-06-15 PROCEDURE — 99236 HOSP IP/OBS SAME DATE HI 85: CPT | Performed by: INTERNAL MEDICINE

## 2025-06-15 PROCEDURE — 84145 PROCALCITONIN (PCT): CPT | Performed by: INTERNAL MEDICINE

## 2025-06-15 PROCEDURE — 250N000011 HC RX IP 250 OP 636: Performed by: EMERGENCY MEDICINE

## 2025-06-15 RX ORDER — AMOXICILLIN 250 MG
2 CAPSULE ORAL 2 TIMES DAILY PRN
Status: DISCONTINUED | OUTPATIENT
Start: 2025-06-15 | End: 2025-06-15 | Stop reason: HOSPADM

## 2025-06-15 RX ORDER — SODIUM CHLORIDE 9 MG/ML
INJECTION, SOLUTION INTRAVENOUS CONTINUOUS
Status: DISCONTINUED | OUTPATIENT
Start: 2025-06-15 | End: 2025-06-15

## 2025-06-15 RX ORDER — ONDANSETRON 2 MG/ML
4 INJECTION INTRAMUSCULAR; INTRAVENOUS EVERY 6 HOURS PRN
Status: DISCONTINUED | OUTPATIENT
Start: 2025-06-15 | End: 2025-06-15 | Stop reason: HOSPADM

## 2025-06-15 RX ORDER — ACETAMINOPHEN 650 MG/1
650 SUPPOSITORY RECTAL EVERY 4 HOURS PRN
Status: DISCONTINUED | OUTPATIENT
Start: 2025-06-15 | End: 2025-06-15 | Stop reason: HOSPADM

## 2025-06-15 RX ORDER — HYDROCODONE BITARTRATE AND ACETAMINOPHEN 5; 325 MG/1; MG/1
1 TABLET ORAL EVERY 4 HOURS PRN
Refills: 0 | Status: DISCONTINUED | OUTPATIENT
Start: 2025-06-15 | End: 2025-06-15 | Stop reason: HOSPADM

## 2025-06-15 RX ORDER — IBUPROFEN 200 MG
400 TABLET ORAL EVERY 6 HOURS PRN
COMMUNITY

## 2025-06-15 RX ORDER — ONDANSETRON 4 MG/1
4 TABLET, ORALLY DISINTEGRATING ORAL EVERY 6 HOURS PRN
Status: DISCONTINUED | OUTPATIENT
Start: 2025-06-15 | End: 2025-06-15 | Stop reason: HOSPADM

## 2025-06-15 RX ORDER — ACETAMINOPHEN 325 MG/1
650 TABLET ORAL EVERY 4 HOURS PRN
COMMUNITY

## 2025-06-15 RX ORDER — ACETAMINOPHEN 325 MG/1
650 TABLET ORAL EVERY 4 HOURS PRN
Status: DISCONTINUED | OUTPATIENT
Start: 2025-06-15 | End: 2025-06-15 | Stop reason: HOSPADM

## 2025-06-15 RX ORDER — HYDROMORPHONE HCL IN WATER/PF 6 MG/30 ML
0.2 PATIENT CONTROLLED ANALGESIA SYRINGE INTRAVENOUS EVERY 4 HOURS PRN
Refills: 0 | Status: DISCONTINUED | OUTPATIENT
Start: 2025-06-15 | End: 2025-06-15 | Stop reason: HOSPADM

## 2025-06-15 RX ORDER — IOPAMIDOL 755 MG/ML
100 INJECTION, SOLUTION INTRAVASCULAR ONCE
Status: COMPLETED | OUTPATIENT
Start: 2025-06-15 | End: 2025-06-15

## 2025-06-15 RX ORDER — SULFAMETHOXAZOLE AND TRIMETHOPRIM 800; 160 MG/1; MG/1
1 TABLET ORAL
COMMUNITY
Start: 2025-06-13 | End: 2025-06-22

## 2025-06-15 RX ORDER — AMOXICILLIN 250 MG
1 CAPSULE ORAL 2 TIMES DAILY PRN
Status: DISCONTINUED | OUTPATIENT
Start: 2025-06-15 | End: 2025-06-15 | Stop reason: HOSPADM

## 2025-06-15 RX ORDER — KETOROLAC TROMETHAMINE 15 MG/ML
15 INJECTION, SOLUTION INTRAMUSCULAR; INTRAVENOUS ONCE
Status: COMPLETED | OUTPATIENT
Start: 2025-06-15 | End: 2025-06-15

## 2025-06-15 RX ORDER — PROCHLORPERAZINE MALEATE 10 MG
10 TABLET ORAL EVERY 6 HOURS PRN
Status: DISCONTINUED | OUTPATIENT
Start: 2025-06-15 | End: 2025-06-15 | Stop reason: HOSPADM

## 2025-06-15 RX ORDER — CEFTRIAXONE 1 G/1
1 INJECTION, POWDER, FOR SOLUTION INTRAMUSCULAR; INTRAVENOUS EVERY 24 HOURS
Status: DISCONTINUED | OUTPATIENT
Start: 2025-06-16 | End: 2025-06-15 | Stop reason: HOSPADM

## 2025-06-15 RX ORDER — SODIUM CHLORIDE 9 MG/ML
INJECTION, SOLUTION INTRAVENOUS CONTINUOUS
Status: DISCONTINUED | OUTPATIENT
Start: 2025-06-15 | End: 2025-06-15 | Stop reason: HOSPADM

## 2025-06-15 RX ORDER — CEFTRIAXONE 1 G/1
1 INJECTION, POWDER, FOR SOLUTION INTRAMUSCULAR; INTRAVENOUS ONCE
Status: COMPLETED | OUTPATIENT
Start: 2025-06-15 | End: 2025-06-15

## 2025-06-15 RX ADMIN — SODIUM CHLORIDE: 9 INJECTION, SOLUTION INTRAVENOUS at 05:29

## 2025-06-15 RX ADMIN — IOPAMIDOL 100 ML: 755 INJECTION, SOLUTION INTRAVENOUS at 02:04

## 2025-06-15 RX ADMIN — SODIUM CHLORIDE 1000 ML: 0.9 INJECTION, SOLUTION INTRAVENOUS at 01:32

## 2025-06-15 RX ADMIN — SODIUM CHLORIDE: 0.9 INJECTION, SOLUTION INTRAVENOUS at 04:44

## 2025-06-15 RX ADMIN — KETOROLAC TROMETHAMINE 15 MG: 15 INJECTION, SOLUTION INTRAMUSCULAR; INTRAVENOUS at 01:34

## 2025-06-15 RX ADMIN — CEFTRIAXONE 1 G: 1 INJECTION, POWDER, FOR SOLUTION INTRAMUSCULAR; INTRAVENOUS at 03:04

## 2025-06-15 RX ADMIN — ACETAMINOPHEN 650 MG: 325 TABLET ORAL at 10:55

## 2025-06-15 ASSESSMENT — ACTIVITIES OF DAILY LIVING (ADL)
ADLS_ACUITY_SCORE: 41

## 2025-06-15 NOTE — ED PROVIDER NOTES
NAME: Kisha Brunner  AGE: 20 year old female  YOB: 2005  MRN: [id number]  EVALUATION DATE & TIME: No admission date for patient encounter.    PCP: Tatiana Miller    ED PROVIDER: Jonathan Vaughn M.D.      Chief Complaint   Patient presents with    Flank Pain     FINAL IMPRESSION:  1. Pyelonephritis, acute      MEDICAL DECISION MAKIN:24 AM I met with the patient, obtained history, performed an initial exam, and discussed options and plan for diagnostics and treatment here in the ED.   2:57 AM I rechecked and updated patient on results and care plan.   3:16 AM I discussed case with Dr. Olmedo, hospitalist, who accepts the patient for admission.   3:28 AM I rechecked and updated patient.     Patient was clinically assessed and consented to treatment. After assessment, medical decision making and workup were discussed with the patient. The patient was agreeable to plan for testing, workup, and treatment.  Pertinent Labs & Imaging studies reviewed. (See chart for details)     Medical Decision Making  I reviewed the EMR: Outpatient Record: Previous ER visit from  and  reviewed along with imaging from previous visits.  I discussed the care with another health care provider: Hospitalist  Admit.    MIPS (CTPE, Dental pain, Flaherty, Sinusitis, Asthma/COPD, Head Trauma): Not Applicable    SEPSIS: None    Kisha Brunner is a 20 year old female who presents with bilateral flank pain.   Differential diagnosis includes but not limited to kidney stone, infected kidney stone, urinary tract infection, pyelonephritis, acute kidney injury.  Patient is 20-year-old female with history of recent abdominal pain.  Patient was seen on  for this in the clinic and then in the ER.  She had several days of it and initially thought to have this.  Repeat visit to the ER on  showed some inflammation around the kidney on the left concerning for pyelonephritis though urine does show  definitively a infection.  Patient was started on Bactrim based on the CT findings.  Patient was feeling better the initial day after however on 6/14 symptoms worsening spreading to the right along with fevers again.  Patient failing outpatient therapy for pyelonephritis and despite the Bactrim  for multiple days now without improved.  Labs were checked and showed no acute kidney injury.  Urinalysis was negative though patient is on antibiotics ready and likely related to that.  No resistant organisms on past urinary cultures.  Additionally metabolic panel was unremarkable.  Patient did have repeat CT and showed stranding around the right kidney now and have the left which does appear to be bilateral pyelonephritis.  No effect of kidney functioning creatinine at this time.  After discussion with patient given the failure of outpatient therapy patient will be planned for admission for IV antibiotics.  Patient given a dose of Rocephin and pain was better controlled after Toradol.  Patient improving and IV fluids continued.  Will continue with observation admission and patient was discussed with hospitalist.  After discussion with hospitalist there was some noted small cysts on the ovaries and pelvic ultrasound was obtained that showed no evidence of torsion.    0 minutes of critical care time    MEDICATIONS GIVEN IN THE EMERGENCY:  Medications   senna-docusate (SENOKOT-S/PERICOLACE) 8.6-50 MG per tablet 1 tablet (has no administration in time range)     Or   senna-docusate (SENOKOT-S/PERICOLACE) 8.6-50 MG per tablet 2 tablet (has no administration in time range)   ondansetron (ZOFRAN ODT) ODT tab 4 mg (has no administration in time range)     Or   ondansetron (ZOFRAN) injection 4 mg (has no administration in time range)   prochlorperazine (COMPAZINE) injection 10 mg (has no administration in time range)     Or   prochlorperazine (COMPAZINE) tablet 10 mg (has no administration in time range)   sodium chloride 0.9 %  infusion ( Intravenous $New Bag 6/15/25 0582)   acetaminophen (TYLENOL) tablet 650 mg (has no administration in time range)     Or   acetaminophen (TYLENOL) Suppository 650 mg (has no administration in time range)   melatonin tablet 5 mg (has no administration in time range)   cefTRIAXone (ROCEPHIN) 1 g vial to attach to  mL bag for ADULTS or NS 50 mL bag for PEDS (has no administration in time range)   HYDROcodone-acetaminophen (NORCO) 5-325 MG per tablet 1 tablet (has no administration in time range)   HYDROmorphone (DILAUDID) injection 0.2 mg (has no administration in time range)   sodium chloride 0.9% BOLUS 1,000 mL (0 mLs Intravenous Stopped 6/15/25 0324)   ketorolac (TORADOL) injection 15 mg (15 mg Intravenous $Given 6/15/25 0134)   iopamidol (ISOVUE-370) solution 100 mL (100 mLs Intravenous $Given 6/15/25 0204)   cefTRIAXone (ROCEPHIN) 1 g vial to attach to  mL bag for ADULTS or NS 50 mL bag for PEDS (0 g Intravenous Stopped 6/15/25 0438)       NEW PRESCRIPTIONS STARTED AT TODAY'S ER VISIT:  New Prescriptions    No medications on file          =================================================================    HPI    Patient information was obtained from: Patient    Use of : N/A         Kisha Brunner is a 20 year old female with no pertinent past medical history, who presents to the ED via walk-in for the evaluation of flank pain.     Patient reports she was seen twice for lower abdominal pain that radiates to her back. Based on the CT two days ago, she was diagnosed with pyelonephritis, UTI and was prescribed antibiotics . She did feel better afterwards for about two days.After getting up this morning, she still had persisting pain in her flank. Yesterday, she developed fevers around 1700. She also notes nausea. Otherwise, she denies any vomiting. Patient reports she brought her back in as patient has had UTIs in the past but does not show any specific resistance to anything.     Per  chart review, patient was seen at [facility] on 6/12/25 for constipation. CT did not find any significant stool burden. Ultimately, utilizing shared decision making patient feels comfortable not repeating any labs or imaging here today. Patient given magnesium citrate.     Per chart review, patient was seen on 6/11/25 for fever, back pain. UA negative for any RBC or WBC. CT Abdomen Pelvis showed nonspecific change to the left upper pole of the kidney, most consistent with pyelonephritis. Patient with no other acute abnormalities on the CT scan.  No significant stool burden, or signs of colitis.  Ovaries were normal in appearance, no signs of any significant abscess or cyst. COVID and flu negative. Discharged home.     REVIEW OF SYSTEMS   Review of Systems     PAST MEDICAL HISTORY:  History reviewed. No pertinent past medical history.    PAST SURGICAL HISTORY:  History reviewed. No pertinent surgical history.    CURRENT MEDICATIONS:      Current Facility-Administered Medications:     acetaminophen (TYLENOL) tablet 650 mg, 650 mg, Oral, Q4H PRN **OR** acetaminophen (TYLENOL) Suppository 650 mg, 650 mg, Rectal, Q4H PRN, Quita Olmedo MD    [START ON 6/16/2025] cefTRIAXone (ROCEPHIN) 1 g vial to attach to  mL bag for ADULTS or NS 50 mL bag for PEDS, 1 g, Intravenous, Q24H, Quita Olmedo MD    HYDROcodone-acetaminophen (NORCO) 5-325 MG per tablet 1 tablet, 1 tablet, Oral, Q4H PRN, Quita Olmedo MD    HYDROmorphone (DILAUDID) injection 0.2 mg, 0.2 mg, Intravenous, Q4H PRN, Quita Olmedo MD    melatonin tablet 5 mg, 5 mg, Oral, At Bedtime PRN, Quita Olmedo MD    ondansetron (ZOFRAN ODT) ODT tab 4 mg, 4 mg, Oral, Q6H PRN **OR** ondansetron (ZOFRAN) injection 4 mg, 4 mg, Intravenous, Q6H PRN, Quita Olmedo MD    prochlorperazine (COMPAZINE) injection 10 mg, 10 mg, Intravenous, Q6H PRN **OR** prochlorperazine (COMPAZINE) tablet 10 mg, 10 mg, Oral, Q6H PRN, [place],  "MD Quita    senna-docusate (SENOKOT-S/PERICOLACE) 8.6-50 MG per tablet 1 tablet, 1 tablet, Oral, BID PRN **OR** senna-docusate (SENOKOT-S/PERICOLACE) 8.6-50 MG per tablet 2 tablet, 2 tablet, Oral, BID PRN, Quita Olmedo MD    sodium chloride 0.9 % infusion, , Intravenous, Continuous, Quita Olmedo MD, Last Rate: 100 mL/hr at 06/15/25 0529, New Bag at 06/15/25 0529    Current Outpatient Medications:     ISOtretinoin (ACCUTANE PO), , Disp: , Rfl:     Pediatric Multiple Vit-C-FA (FRUITY CHEWABLES MULTIVITAMIN PO), , Disp: , Rfl:     ALLERGIES:  No Known Allergies    FAMILY HISTORY:  Family History   Problem Relation Age of Onset    Cardiovascular Maternal Grandfather        SOCIAL HISTORY:   Social History     Socioeconomic History    Marital status: Single   Tobacco Use    Smoking status: Never    Smokeless tobacco: Never     Social Drivers of Health      Received from [facility]       PHYSICAL EXAM:    Vitals: /63   Pulse 87   Temp 98  F (36.7  C) (Oral)   Resp 18   Ht 1.676 m (5' 6\")   Wt 71.3 kg (157 lb 3.2 oz)   LMP 05/28/2025   SpO2 99%   BMI 25.37 kg/m     Physical Exam  Vitals and nursing note reviewed.   Constitutional:       General: She is not in acute distress.     Appearance: Normal appearance. She is normal weight. She is not ill-appearing or toxic-appearing.   HENT:      Head: Normocephalic.   Eyes:      Conjunctiva/sclera: Conjunctivae normal.   Cardiovascular:      Rate and Rhythm: Normal rate and regular rhythm.      Heart sounds: Normal heart sounds.   Pulmonary:      Effort: Pulmonary effort is normal. No respiratory distress.      Breath sounds: Normal breath sounds.   Abdominal:      General: Abdomen is flat. There is no distension.      Palpations: Abdomen is soft.      Tenderness: There is abdominal tenderness in the suprapubic area and left lower quadrant. There is right CVA tenderness and left CVA tenderness. " There is no guarding or rebound.   Musculoskeletal:      Cervical back: Normal range of motion and neck supple.      Right lower leg: No edema.      Left lower leg: No edema.   Skin:     General: Skin is warm and dry.      Coloration: Skin is not pale.   Neurological:      General: No focal deficit present.      Mental Status: She is alert.   Psychiatric:         Behavior: Behavior normal.           LAB:  All pertinent labs reviewed and interpreted.  Labs Ordered and Resulted from Time of ED Arrival to Time of ED Departure   ROUTINE UA WITH MICROSCOPIC REFLEX TO CULTURE - Abnormal       Result Value    Color Urine Light Yellow      Appearance Urine Clear      Glucose Urine Negative      Bilirubin Urine Negative      Ketones Urine Negative      Specific Gravity Urine 1.013      Blood Urine Negative      pH Urine 6.0      Protein Albumin Urine Negative      Urobilinogen Urine Normal      Nitrite Urine Negative      Leukocyte Esterase Urine Negative      Bacteria Urine Few (*)     RBC Urine <1      WBC Urine 5      Squamous Epithelials Urine 2 (*)    COMPREHENSIVE METABOLIC PANEL - Abnormal    Sodium 138      Potassium 4.2      Carbon Dioxide (CO2) 23      Anion Gap 13      Urea Nitrogen 14.7      Creatinine 0.93      GFR Estimate 89      Calcium 9.8      Chloride 102      Glucose 109 (*)     Alkaline Phosphatase 64      AST 25      ALT 14      Protein Total 7.9      Albumin 4.5      Bilirubin Total 0.3     LACTIC ACID WHOLE BLOOD WITH 1X REPEAT IN 2 HR WHEN >2 - Abnormal    Lactic Acid, Initial 0.6 (*)    ERYTHROCYTE SEDIMENTATION RATE AUTO - Abnormal    Erythrocyte Sedimentation Rate 26 (*)    CRP INFLAMMATION - Abnormal    CRP Inflammation 98.50 (*)    COMPREHENSIVE METABOLIC PANEL - Abnormal    Sodium 140      Potassium 3.8      Carbon Dioxide (CO2) 23      Anion Gap 11      Urea Nitrogen 10.8      Creatinine 0.95      GFR Estimate 87      Calcium 8.9      Chloride 106      Glucose 106 (*)     Alkaline Phosphatase  53      AST 18      ALT 13      Protein Total 6.9      Albumin 4.1      Bilirubin Total 0.3     CBC WITH PLATELETS AND DIFFERENTIAL - Abnormal    WBC Count 5.7      RBC Count 3.93      Hemoglobin 11.6 (*)     Hematocrit 33.5 (*)     MCV 85      MCH 29.5      MCHC 34.6      RDW 11.4      Platelet Count 194      % Neutrophils 55      % Lymphocytes 29      % Monocytes 14      % Eosinophils 2      % Basophils 1      % Immature Granulocytes 0      NRBCs per 100 WBC 0      Absolute Neutrophils 3.1      Absolute Lymphocytes 1.6      Absolute Monocytes 0.8      Absolute Eosinophils 0.1      Absolute Basophils 0.0      Absolute Immature Granulocytes 0.0      Absolute NRBCs 0.0     LIPASE - Normal    Lipase 32     CBC WITH PLATELETS AND DIFFERENTIAL    WBC Count 7.3      RBC Count 4.39      Hemoglobin 13.1      Hematocrit 37.7      MCV 86      MCH 29.8      MCHC 34.7      RDW 11.8      Platelet Count 263      % Neutrophils 66      % Lymphocytes 22      % Monocytes 10      % Eosinophils 2      % Basophils 1      % Immature Granulocytes 0      NRBCs per 100 WBC 0      Absolute Neutrophils 4.8      Absolute Lymphocytes 1.6      Absolute Monocytes 0.8      Absolute Eosinophils 0.1      Absolute Basophils 0.0      Absolute Immature Granulocytes 0.0      Absolute NRBCs 0.0     PROCALCITONIN       RADIOLOGY:  US Pelvis Cmplt w Transvag & Doppler LmtPel Duplex Limited   Final Result   IMPRESSION:    1.  Several small follicles are present within the periphery of both ovaries. This appearance can be physiologic, but can also be seen with polycystic ovarian syndrome. Recommend clinical correlation.   2.  Otherwise, unremarkable appearance of the uterus and ovaries. Blood flow is visualized in both ovaries.             CT Abdomen Pelvis w Contrast   Final Result   IMPRESSION:   1.  Few heterogenous hypoenhancing areas in the left kidney and to a lesser extent right kidney with mild diffuse mucosal enhancement of the renal collecting  system, left more than right, worrisome for urinary tract infection/pyelonephritis.   2.  Mild diffuse urinary bladder wall thickening, nonspecific, can be seen with chronic cystitis.   3.  Multiple small bilateral ovarian follicles, nonspecific, can be seen with polycystic ovarian syndrome, can be further evaluated with pelvic ultrasound if clinically warranted.               PROCEDURES:   Procedures       I, Smiley Walters, am serving as a scribe to document services personally performed by Dr. Jonathan Vaughn  based on my observation and the provider's statements to me. I, Jonathan Vaughn MD attest that Smiley Walters is acting in a scribe capacity, has observed my performance of the services and has documented them in accordance with my direction.      Jonathan Vaugnh M.D.  Emergency Medicine  [facility] Emergency Department       Jonathan Vaughn MD  06/15/25 0628

## 2025-06-15 NOTE — ED TRIAGE NOTES
Pt arrives to ed with c/o of bilateral flank pain, and bilateral lower abd pain, has been having flank pain for 7 days got diagnosed with kidney infection 2 days ago and has been on anabiotics since pain changed from one flank to both and to both sides of abd. Denies nausea, vomiting.      Triage Assessment (Adult)       Row Name 06/14/25 [id number]          Triage Assessment    Airway WDL WDL        Respiratory WDL    Respiratory WDL WDL        Cardiac WDL    Cardiac WDL WDL        Cognitive/Neuro/Behavioral WDL    Cognitive/Neuro/Behavioral WDL WDL

## 2025-06-15 NOTE — H&P
"[facility] MEDICINE ADMISSION HISTORY AND PHYSICAL       Assessment & Plan        Present on Admission (POA)    1. Bilateral flank pain, consider acute pyelonephritis -  etiology not clear    - CT abdomen --  left more than right, worrisome for urinary tract infection/pyelonephritis and chronic cystitis --- However, UA was not suggestive of UTI today. However had a UA in PCP clinic that was told suggestive of UTI and was given Bactrim. Stll having off and on fevers    - Continue Rocephin for now  - Follow up pelvic US  - PRN pain meds and anti emetics     2.  CT - Multiple small bilateral ovarian follicles, nonspecific, can be seen with polycystic ovarian syndrome  - has dysmenorrhea really bad  - she was told of above findings and to see her OB/GYN for possible eval of PCOS      Chronic Medical Conditions    1. Dysmenorrhea       Clinically Significant Risk Factors Present on Admission                             # Overweight: Estimated body mass index is 25.37 kg/m  as calculated from the following:    Height as of this encounter: 1.676 m (5' 6\").    Weight as of this encounter: 71.3 kg (157 lb 3.2 oz).                VTE prophylaxis --  SCDs   Diet --  regular   Code Status -- Full   Barriers to discharge -- Admitting/Acute medical condition/s  Discharge Disposition and goals --  Unable to determine at this point, pending progress/treatment response.     PPE - I was wearing PPE including but not limited to - N95 mask, Gloves, and/or Safety glasses.  Admission Status -- Observation,    Care plan is based on available information and patient's condition at encounter. Care plan was discussed and agreed to proceed by the patient/family member. Made aware that care plan may change.     I recommend to review/revise history/care plan for information/results not available during my encounter. All or some home medication/s were not resumed or was modified on admission due to safety concerns. " "Will have rounding AM doc  review safety to resume held/modified home medications.     Availability -- If need to coordinate care after night shift  -- Contact assigned AM rounding Hospitalist.     75 minutes spent by me on the date of service doing chart review, history, exam, diagnostic test results interpretation, care coordination with RN, RT and/or Pharm, & further activities per the note.      Chino Olmedo MD, MPH, FACP, [facility]  Internal Medicine - Hospitalist        Chief Complaint Abdominal pain       HISTORY     - Patient was seen in ED - 10   - Been visiting the ED since June 11 - for left sided abdominal pain, fevers, low back pain/flank pain, constipation issues.   - Had CT scan of the abdomen June 11 - findings appear to be UTI but UA was negative. Then on June 12 - she was given mag citrate for constipation. And had BM     - She is back today due to same issues. She still having off and on fevers for 2 days. She was seen at her PCP clinic and had UA and was told her UA was \"dirty\" - was give Bactrim. Denies any urinary symptoms.    - No vaginal discharge. No sexual intercourse.     - ED course/treatments/referral - repeat CT abdomen suggests -  left more than right, worrisome for urinary tract infection/pyelonephritis and chronic cystitis. Also has  Multiple small bilateral ovarian follicles, nonspecific, can be seen with polycystic ovarian syndrome    - WBC is normal and no evidence of UTI. Was given rocephin.      - ROS --- No headache. No dizziness. No weakness. No CP or SOB. No palpitations.  No bleeding symptoms. No weight loss. Rest of 12 point ROS was reviewed and negative.       Past Medical History     History reviewed. No pertinent past medical history.      Surgical History     History reviewed. No pertinent surgical history.     Family History      Family History   Problem Relation Age of Onset    Cardiovascular Maternal Grandfather          Social History      .  Social History " "    Socioeconomic History    Marital status: Single     Spouse name: Not on file    Number of children: Not on file    Years of education: Not on file    Highest education level: Not on file   Occupational History    Not on file   Tobacco Use    Smoking status: Never    Smokeless tobacco: Never   Substance and Sexual Activity    Alcohol use: Not on file    Drug use: Not on file    Sexual activity: Not on file   Other Topics Concern    Not on file   Social History Narrative    Not on file     Social Drivers of Health     Financial Resource Strain: Not on file   Food Insecurity: Not on file   Transportation Needs: Not on file   Physical Activity: Not on file   Stress: Not on file   Social Connections: Unknown (8/4/2022)    Received from [facility]    Social Connections     Frequency of Communication with Friends and Family: Not on file   Interpersonal Safety: Not on file   Housing Stability: Not on file          Allergies      No Known Allergies      Prior to Admission Medications      Current Facility-Administered Medications   Medication Dose Route Frequency Provider Last Rate Last Admin    cefTRIAXone (ROCEPHIN) 1 g vial to attach to  mL bag for ADULTS or NS 50 mL bag for PEDS  1 g Intravenous Once Wall, Jonathan Herrera MD   1 g at 06/15/25 0304     Current Outpatient Medications   Medication Sig Dispense Refill    ISOtretinoin (ACCUTANE PO)  (Patient not taking: Reported on 6/11/2025)      Pediatric Multiple Vit-C-FA (FRUITY CHEWABLES MULTIVITAMIN PO)  (Patient not taking: Reported on 6/11/2025)             Review of Systems     A 12 point comprehensive review of systems was negative except as noted above in HPI.    PHYSICAL EXAMINATION       Vitals      Vitals: /56   Pulse 80   Temp 98  F (36.7  C) (Oral)   Resp 18   Ht 1.676 m (5' 6\")   Wt 71.3 kg (157 lb 3.2 oz)   LMP 05/28/2025   SpO2 95%   BMI 25.37 kg/m    BMI= Body mass index is 25.37 " kg/m .      Examination     General Appearance:  Alert, cooperative, no distress  Head:    Normocephalic, without obvious abnormality, atraumatic  EENT:  PERRL, conjunctiva/corneas clear, EOM's intact.   Neck:   Supple, symmetrical, trachea midline, no adenopathy; no NVE  Back:  Symmetric, no curvature, no CVA tenderness  Chest/Lungs: Clear to auscultation bilaterally, respirations unlabored, No tenderness or deformity. No abdominal breathing or use of accessory muscles.   Heart:    Regular rate and rhythm, S1 and S2 normal, no murmur, rub   or gallop  Abdomen: Soft, non-tender, bowel sounds active all four quadrants, not peritoneal on palpation. Not distended  Extremities:  Extremities normal, atraumatic, no swelling   Skin:  Skin color, texture, turgor normal, no rashes or lesion  Neurologic:  Awake and alert,No lateralizing or localizing signs          Pertinent Lab     Results for orders placed or performed during the hospital encounter of 06/15/25   CT Abdomen Pelvis w Contrast    Impression    IMPRESSION:  1.  Few heterogenous hypoenhancing areas in the left kidney and to a lesser extent right kidney with mild diffuse mucosal enhancement of the renal collecting system, left more than right, worrisome for urinary tract infection/pyelonephritis.  2.  Mild diffuse urinary bladder wall thickening, nonspecific, can be seen with chronic cystitis.  3.  Multiple small bilateral ovarian follicles, nonspecific, can be seen with polycystic ovarian syndrome, can be further evaluated with pelvic ultrasound if clinically warranted.     UA with Microscopic reflex to Culture    Specimen: Urine, Clean Catch   Result Value Ref Range    Color Urine Light Yellow Colorless, Straw, Light Yellow, Yellow    Appearance Urine Clear Clear    Glucose Urine Negative Negative mg/dL    Bilirubin Urine Negative Negative    Ketones Urine Negative Negative mg/dL    Specific Gravity Urine 1.013 1.001 - 1.030    Blood Urine Negative Negative     pH Urine 6.0 5.0 - 7.0    Protein Albumin Urine Negative Negative mg/dL    Urobilinogen Urine Normal Normal mg/dL    Nitrite Urine Negative Negative    Leukocyte Esterase Urine Negative Negative    Bacteria Urine Few (A) None Seen /HPF    RBC Urine <1 <=2 /HPF    WBC Urine 5 <=5 /HPF    Squamous Epithelials Urine 2 (H) <=1 /HPF   Comprehensive metabolic panel   Result Value Ref Range    Sodium 138 135 - 145 mmol/L    Potassium 4.2 3.4 - 5.3 mmol/L    Carbon Dioxide (CO2) 23 22 - 29 mmol/L    Anion Gap 13 7 - 15 mmol/L    Urea Nitrogen 14.7 6.0 - 20.0 mg/dL    Creatinine 0.93 0.51 - 0.95 mg/dL    GFR Estimate 89 >60 mL/min/1.73m2    Calcium 9.8 8.8 - 10.4 mg/dL    Chloride 102 98 - 107 mmol/L    Glucose 109 (H) 70 - 99 mg/dL    Alkaline Phosphatase 64 40 - 150 U/L    AST 25 0 - 45 U/L    ALT 14 0 - 50 U/L    Protein Total 7.9 6.4 - 8.3 g/dL    Albumin 4.5 3.5 - 5.2 g/dL    Bilirubin Total 0.3 <=1.2 mg/dL   Result Value Ref Range    Lipase 32 13 - 60 U/L   CBC with platelets and differential   Result Value Ref Range    WBC Count 7.3 4.0 - 11.0 10e3/uL    RBC Count 4.39 3.80 - 5.20 10e6/uL    Hemoglobin 13.1 11.7 - 15.7 g/dL    Hematocrit 37.7 35.0 - 47.0 %    MCV 86 78 - 100 fL    MCH 29.8 26.5 - 33.0 pg    MCHC 34.7 31.5 - 36.5 g/dL    RDW 11.8 10.0 - 15.0 %    Platelet Count 263 150 - 450 10e3/uL    % Neutrophils 66 %    % Lymphocytes 22 %    % Monocytes 10 %    % Eosinophils 2 %    % Basophils 1 %    % Immature Granulocytes 0 %    NRBCs per 100 WBC 0 <1 /100    Absolute Neutrophils 4.8 1.6 - 8.3 10e3/uL    Absolute Lymphocytes 1.6 0.8 - 5.3 10e3/uL    Absolute Monocytes 0.8 0.0 - 1.3 10e3/uL    Absolute Eosinophils 0.1 0.0 - 0.7 10e3/uL    Absolute Basophils 0.0 0.0 - 0.2 10e3/uL    Absolute Immature Granulocytes 0.0 <=0.4 10e3/uL    Absolute NRBCs 0.0 10e3/uL           Pertinent Radiology

## 2025-06-15 NOTE — PHARMACY-ADMISSION MEDICATION HISTORY
Pharmacy Intern Admission Medication History    Admission medication history is complete. The information provided in this note is only as accurate as the sources available at the time of the update.    Information Source(s): Patient and CareEverywhere/SureScripts via in-person    Pertinent Information: Patient had bottle of Magnesium Citrate solution (296 mL) on Friday for 1 dose. Patient has 8 days left of Bactrim therapy (completion expected 6/22/25).    Changes made to PTA medication list:  Added: ibu, apap, bactrim, multivitamin  Deleted: accutane, pediatric gummies  Changed: n/a    Allergies reviewed with patient and updates made in EHR: yes    Medications available for use during hospital stay: None    Medication History Completed By: Kristen Catherine 6/15/2025 9:14 AM    PTA Med List   Medication Sig Note Last Dose/Taking    acetaminophen (TYLENOL) 325 MG tablet Take 650 mg by mouth every 4 hours as needed for mild pain.  6/14/2025 at  9:00 PM    ibuprofen (ADVIL/MOTRIN) 200 MG tablet Take 400 mg by mouth every 6 hours as needed for pain.  Past Week    Multiple Vitamins-Minerals (MULTI ADULT GUMMIES PO) Take 2 chew tab by mouth every morning.  Taking    sulfamethoxazole-trimethoprim (BACTRIM DS) 800-160 MG tablet Take 1 tablet by mouth 2 times daily. 6/15/2025: 8 days left 6/14/2025 at  7:00 PM

## 2025-06-15 NOTE — DISCHARGE SUMMARY
"[facility]  Hospitalist Discharge Summary      Date of Admission:  6/15/2025  Date of Discharge:  6/15/2025 12:34 PM  Discharging Provider: REN GAVIN MD  Discharge Service: Hospitalist Service    Discharge Diagnoses   Bilateral pyelonephritis    Clinically Significant Risk Factors     # Overweight: Estimated body mass index is 25.37 kg/m  as calculated from the following:    Height as of this encounter: 1.676 m (5' 6\").    Weight as of this encounter: 71.3 kg (157 lb 3.2 oz).       Follow-ups Needed After Discharge   Follow-up Appointments       Hospital Follow-up with Existing Primary Care Provider (PCP)          Schedule Primary Care visit within: 7 Days             7-Day Micro Results       Collected Updated Procedure Result Status      06/11/2025 2101 06/11/2025 2153 Influenza A/B, RSV and SARS-CoV2 PCR (COVID-19) Nasopharyngeal [[id number]]    Swab from Nasopharyngeal    Final result Component Value   Influenza A PCR Negative   Influenza B PCR Negative   RSV PCR Negative   SARS CoV2 PCR Negative   NEGATIVE: SARS-CoV-2 (COVID-19) RNA not detected, presumed negative.                      Unresulted Labs Ordered in the Past 30 Days of this Admission       No orders found from 5/16/2025 to 6/16/2025.            Discharge Disposition   Discharged to home  Condition at discharge: Stable    Hospital Course   Clara is a 20-year-old female with not much past medical history who presented to the ED for right flank pain that started yesterday.  Her left flank pain started on 6/5.  She went to ED on 6/9 and left before she could be seen because there was a long line.  She had a fever of 102 on 6/10 she went to urgency room on the 6/11 and was redirected to [facility] ED, where she received CT abdomen and urinalysis.  CT abdomen showed possible UTI urinalysis was clean, still possible in nephrolithiasis which has passed.  She never saw stone passing.  She also denies hematuria, dysuria.  The " flank pain was off and on, which is not related to a position change.  She denies nausea/vomiting.  She went to [facility], which is her primary care on 6/12.  Urinalysis was positive and urine culture shows>100,000 E Coli (urine culture is faxed to me but not urinalysis).  She was started on Bactrim.  Her symptoms improved initially but went to the right side from the left last night.  She came to the ED for right flank pain and an elevated temperature at 99.9.  In the ED, her vitals are stable, urinalysis is clean.  CT showed Few heterogenous hypoenhancing areas in the left kidney and to a lesser extent right kidney with mild diffuse mucosal enhancement of the renal collecting system, left more than right, worrisome for urinary tract infection/pyelonephritis.2.  Mild diffuse urinary bladder wall thickening, nonspecific, can be seen with chronic cystitis.  She was given IV ceftriaxone.  Given she is functional, not having nausea or fever, she is recommended to go home and continue Bactrim.  She will schedule an follow-up appointment with [facility].    Consultations This Hospital Stay   None    Code Status   Full Code    Time Spent on this Encounter   I, REN GAVIN MD, personally saw the patient today and spent greater than 30 minutes discharging this patient.       REN GAVIN MD  [facility] EMERGENCY ROOM  [facility] [facility] [address]  Phone: [phone]  Fax: [phone]  ______________________________________________________________________    Physical Exam   Vital Signs: Temp: 99.5  F (37.5  C) Temp src: Oral BP: 128/77 Pulse: 78   Resp: 16 SpO2: 100 % O2 Device: None (Room air)    Weight: 157 lbs 3.2 oz  General Appearance: Alert and wake, not in distress  Respiratory: clear lungs, no crackles or wheezing  Cardiovascular: rhythmic, normal S1 and S2, no murmur  Neurology: oriented x 3  Psych: cooperative and calm, normal affect  GI: soft,  non-tender, normal bowel sound, no CVA tenderness  Extremities: LE edema: No         Primary Care Physician   Tatiana Miller    Discharge Orders      Reason for your hospital stay    Pyelonephritis likely bilaterally, resolving     Activity    Your activity upon discharge: activity as tolerated     Diet    Follow this diet upon discharge: Current Diet:Orders Placed This Encounter      Regular Diet Adult     Hospital Follow-up with Existing Primary Care Provider (PCP)            Significant Results and Procedures   Most Recent 3 CBC's:  Recent Labs   Lab Test 06/15/25  0553 06/15/25  0023 06/11/25  1803   WBC 5.7 7.3 9.6   HGB 11.6* 13.1 13.5   MCV 85 86 86    263 226     Most Recent 3 BMP's:  Recent Labs   Lab Test 06/15/25  0553 06/15/25  0023 06/11/25  1803    138 136   POTASSIUM 3.8 4.2 3.7   CHLORIDE 106 102 97*   CO2 23 23 23   BUN 10.8 14.7 10.7   CR 0.95 0.93 0.89   ANIONGAP 11 13 16*   ANNE-MARIE 8.9 9.8 9.7   * 109* 115*     Most Recent 2 LFT's:  Recent Labs   Lab Test 06/15/25  0553 06/15/25  0023   AST 18 25   ALT 13 14   ALKPHOS 53 64   BILITOTAL 0.3 0.3     Most Recent Urinalysis:  Recent Labs   Lab Test 06/15/25  0023   COLOR Light Yellow   APPEARANCE Clear   URINEGLC Negative   URINEBILI Negative   URINEKETONE Negative   SG 1.013   UBLD Negative   URINEPH 6.0   PROTEIN Negative   NITRITE Negative   LEUKEST Negative   RBCU <1   WBCU 5   ,   Results for orders placed or performed during the hospital encounter of 06/15/25   CT Abdomen Pelvis w Contrast    Narrative    EXAM: CT ABDOMEN PELVIS W CONTRAST  LOCATION: [facility]  DATE: 6/15/2025    INDICATION: Bilateral flank pain, history of pyelonephritis on antibiotics, now with worsening symptoms.  COMPARISON: CT abdomen and pelvis on 6/11/2025.  TECHNIQUE: CT scan of the abdomen and pelvis was performed after the injection of 100 ml Isovue 370 intravenously. Multiplanar reformats were obtained. Dose reduction  techniques were used.    FINDINGS:   LOWER CHEST: Lung bases are clear.    ABDOMEN/PELVIS:    HEPATOBILIARY: No suspicious focal hepatic lesion. No radiodense gallstones.    PANCREAS: No main pancreatic ductal dilatation or definite solid pancreatic mass.    SPLEEN: No splenomegaly.    ADRENAL GLANDS: No adrenal nodules.    KIDNEYS/BLADDER: No radiodense kidney/ureteral stones or hydronephrosis in either kidney. Few heterogenous hypoenhancing areas in the left kidney and to a lesser extent of the right kidney with mild diffuse mucosal enhancement of the renal collecting   system, left more than right, worrisome for urinary tract infection. Mild diffuse urinary bladder wall thickening, nonspecific, can be seen with chronic cystitis.    BOWEL: No abnormally dilated bowel loops.     PERITONEUM: No evidence of free fluid in the abdomen and pelvis. No free peritoneal or portal venous gas.    PELVIC ORGANS: 2.2 cm left adnexal cyst, likely ovarian. Multiple small bilateral ovarian follicles, nonspecific, can be seen with polycystic ovarian syndrome.    VASCULATURE: Unremarkable.    LYMPH NODES: No significant abdominopelvic lymphadenopathy.    MUSCULOSKELETAL: No suspicious osseous lesion.      Impression    IMPRESSION:  1.  Few heterogenous hypoenhancing areas in the left kidney and to a lesser extent right kidney with mild diffuse mucosal enhancement of the renal collecting system, left more than right, worrisome for urinary tract infection/pyelonephritis.  2.  Mild diffuse urinary bladder wall thickening, nonspecific, can be seen with chronic cystitis.  3.  Multiple small bilateral ovarian follicles, nonspecific, can be seen with polycystic ovarian syndrome, can be further evaluated with pelvic ultrasound if clinically warranted.     US Pelvis Cmplt w Transvag & Doppler LmtPel Duplex Limited    Narrative    EXAM: ULTRASOUND PELVIS WITH ENDOVAGINAL IMAGING AND DOPPLER  LOCATION: [facility]  HOSPITAL  DATE/TIME: 06/15/2025, 5:22 AM CDT    INDICATION: Pelvic pain.  COMPARISON: 06/15/2025 at 0153 hours - CT abdomen and pelvis.    TECHNIQUE: Transabdominal scans were performed. Endovaginal ultrasound was performed to better visualize the adnexa. Color flow with spectral Doppler and waveform analysis performed.    FINDINGS:    UTERUS: 6.4 x 2.7 x 4.2 cm in long axis, short axis and transverse dimensions, respectively. Arcuate or subseptate configuration of the uterus. No myometrial masses.    ENDOMETRIUM: The endometrial stripe is trilaminar, measuring 0.6 cm in thickness.    RIGHT OVARY: The ovary measures 4.3 x 3.7 x 2.9 cm. Several small follicles are present in the periphery of the ovary. Blood flow is visualized in the ovary.    LEFT OVARY: The ovary measures 4.2 x 4.2 x 2.1 cm. Several small follicles are present in the periphery of the ovary. Blood flow is visualized in the ovary.    OTHER: A 2.4 x 2.1 x 2.0 cm simple-appearing cyst is present in the left adnexal region, suggestive of a paraovarian cyst and of unlikely clinical significance. No free fluid in the pelvis.      Impression    IMPRESSION:   1.  Several small follicles are present within the periphery of both ovaries. This appearance can be physiologic, but can also be seen with polycystic ovarian syndrome. Recommend clinical correlation.  2.  Otherwise, unremarkable appearance of the uterus and ovaries. Blood flow is visualized in both ovaries.            Discharge Medications   Discharge Medication List as of 6/15/2025 12:25 PM        CONTINUE these medications which have NOT CHANGED    Details   acetaminophen (TYLENOL) 325 MG tablet Take 650 mg by mouth every 4 hours as needed for mild pain., Historical      ibuprofen (ADVIL/MOTRIN) 200 MG tablet Take 400 mg by mouth every 6 hours as needed for pain., Historical      Multiple Vitamins-Minerals (MULTI ADULT GUMMIES PO) Take 2 chew tab by mouth every morning., Historical       sulfamethoxazole-trimethoprim (BACTRIM DS) 800-160 MG tablet Take 1 tablet by mouth 2 times daily., Historical           Allergies   No Known Allergies

## 2025-06-17 ENCOUNTER — PATIENT OUTREACH (OUTPATIENT)
Dept: CARE COORDINATION | Facility: CLINIC | Age: 20
End: 2025-06-17
Payer: COMMERCIAL

## 2025-06-17 NOTE — PROGRESS NOTES
Clinic Care Coordination Contact  [facility]    Clinical Data: Care Coordinator Outreach    Outreach Documentation Number of Outreach Attempt   6/17/2025   9:36 AM 2           Care Coordinator will do no further outreaches at this time.     Shira Devine [facility]  Clinic Care Coordination  [facility]   Phone: [phone]  [email or web address]

## 2025-06-19 ENCOUNTER — LAB REQUISITION (OUTPATIENT)
Dept: LAB | Facility: CLINIC | Age: 20
End: 2025-06-19

## 2025-06-19 DIAGNOSIS — N92.6 IRREGULAR MENSTRUATION, UNSPECIFIED: ICD-10-CM

## 2025-06-19 LAB
EST. AVERAGE GLUCOSE BLD GHB EST-MCNC: 105 MG/DL
HBA1C MFR BLD: 5.3 %

## 2025-06-19 PROCEDURE — 83036 HEMOGLOBIN GLYCOSYLATED A1C: CPT | Performed by: OBSTETRICS & GYNECOLOGY

## 2025-06-22 ENCOUNTER — HEALTH MAINTENANCE LETTER (OUTPATIENT)
Age: 20
End: 2025-06-22

## 2025-06-22 LAB
TESTOST FREE SERPL-MCNC: 0.34 NG/DL
TESTOST SERPL-MCNC: 13 NG/DL (ref 8–60)

## 2025-06-26 LAB — 17OHP SERPL-MCNC: 40 NG/DL
